# Patient Record
Sex: FEMALE | Race: WHITE | Employment: FULL TIME | URBAN - NONMETROPOLITAN AREA
[De-identification: names, ages, dates, MRNs, and addresses within clinical notes are randomized per-mention and may not be internally consistent; named-entity substitution may affect disease eponyms.]

---

## 2019-06-07 ENCOUNTER — APPOINTMENT (OUTPATIENT)
Dept: CT IMAGING | Age: 34
End: 2019-06-07
Payer: MEDICAID

## 2019-06-07 ENCOUNTER — HOSPITAL ENCOUNTER (EMERGENCY)
Age: 34
Discharge: HOME | End: 2019-06-07
Attending: EMERGENCY MEDICINE
Payer: MEDICAID

## 2019-06-07 VITALS
WEIGHT: 220 LBS | BODY MASS INDEX: 32.58 KG/M2 | DIASTOLIC BLOOD PRESSURE: 80 MMHG | TEMPERATURE: 97.9 F | HEART RATE: 97 BPM | HEIGHT: 69 IN | SYSTOLIC BLOOD PRESSURE: 115 MMHG | RESPIRATION RATE: 16 BRPM | OXYGEN SATURATION: 96 %

## 2019-06-07 DIAGNOSIS — R10.10 PAIN OF UPPER ABDOMEN: ICD-10-CM

## 2019-06-07 DIAGNOSIS — R11.2 NAUSEA AND VOMITING, INTRACTABILITY OF VOMITING NOT SPECIFIED, UNSPECIFIED VOMITING TYPE: Primary | ICD-10-CM

## 2019-06-07 DIAGNOSIS — K86.1 IDIOPATHIC CHRONIC PANCREATITIS (HCC): ICD-10-CM

## 2019-06-07 LAB
ALBUMIN SERPL-MCNC: 4 G/DL (ref 3.5–5.1)
ALP BLD-CCNC: 64 U/L (ref 38–126)
ALT SERPL-CCNC: 9 U/L (ref 11–66)
AMPHETAMINE+METHAMPHETAMINE URINE SCREEN: NEGATIVE
ANION GAP SERPL CALCULATED.3IONS-SCNC: 12 MEQ/L (ref 8–16)
AST SERPL-CCNC: 21 U/L (ref 5–40)
BACTERIA: ABNORMAL /HPF
BARBITURATE QUANTITATIVE URINE: NEGATIVE
BASOPHILS # BLD: 0.4 %
BASOPHILS ABSOLUTE: 0 THOU/MM3 (ref 0–0.1)
BENZODIAZEPINE QUANTITATIVE URINE: NEGATIVE
BILIRUB SERPL-MCNC: 0.3 MG/DL (ref 0.3–1.2)
BILIRUBIN URINE: ABNORMAL
BLOOD, URINE: NEGATIVE
BUN BLDV-MCNC: 12 MG/DL (ref 7–22)
CALCIUM SERPL-MCNC: 10.2 MG/DL (ref 8.5–10.5)
CANNABINOID QUANTITATIVE URINE: POSITIVE
CASTS 2: ABNORMAL /LPF
CASTS UA: ABNORMAL /LPF
CHARACTER, URINE: ABNORMAL
CHLORIDE BLD-SCNC: 108 MEQ/L (ref 98–111)
CO2: 19 MEQ/L (ref 23–33)
COCAINE METABOLITE QUANTITATIVE URINE: NEGATIVE
COLOR: ABNORMAL
CREAT SERPL-MCNC: 0.7 MG/DL (ref 0.4–1.2)
CRYSTALS, UA: ABNORMAL
EOSINOPHIL # BLD: 0.3 %
EOSINOPHILS ABSOLUTE: 0 THOU/MM3 (ref 0–0.4)
EPITHELIAL CELLS, UA: ABNORMAL /HPF
ERYTHROCYTE [DISTWIDTH] IN BLOOD BY AUTOMATED COUNT: 13.2 % (ref 11.5–14.5)
ERYTHROCYTE [DISTWIDTH] IN BLOOD BY AUTOMATED COUNT: 37.9 FL (ref 35–45)
GFR SERPL CREATININE-BSD FRML MDRD: > 90 ML/MIN/1.73M2
GLUCOSE BLD-MCNC: 103 MG/DL (ref 70–108)
GLUCOSE URINE: NEGATIVE MG/DL
HCT VFR BLD CALC: 35.7 % (ref 37–47)
HEMOGLOBIN: 12.8 GM/DL (ref 12–16)
ICTOTEST: NEGATIVE
IMMATURE GRANS (ABS): 0.08 THOU/MM3 (ref 0–0.07)
IMMATURE GRANULOCYTES: 0.8 %
KETONES, URINE: ABNORMAL
LEUKOCYTE ESTERASE, URINE: ABNORMAL
LIPASE: 167.5 U/L (ref 5.6–51.3)
LYMPHOCYTES # BLD: 26.6 %
LYMPHOCYTES ABSOLUTE: 2.5 THOU/MM3 (ref 1–4.8)
MCH RBC QN AUTO: 28.6 PG (ref 26–33)
MCHC RBC AUTO-ENTMCNC: 35.9 GM/DL (ref 32.2–35.5)
MCV RBC AUTO: 79.9 FL (ref 81–99)
MISCELLANEOUS 2: ABNORMAL
MONOCYTES # BLD: 5.3 %
MONOCYTES ABSOLUTE: 0.5 THOU/MM3 (ref 0.4–1.3)
NITRITE, URINE: NEGATIVE
NUCLEATED RED BLOOD CELLS: 0 /100 WBC
OPIATES, URINE: NEGATIVE
OSMOLALITY CALCULATION: 277.5 MOSMOL/KG (ref 275–300)
OXYCODONE: POSITIVE
PH UA: 6 (ref 5–9)
PHENCYCLIDINE QUANTITATIVE URINE: NEGATIVE
PLATELET # BLD: 311 THOU/MM3 (ref 130–400)
PMV BLD AUTO: 9.3 FL (ref 9.4–12.4)
POTASSIUM SERPL-SCNC: 4.9 MEQ/L (ref 3.5–5.2)
PREGNANCY, URINE: NEGATIVE
PROTEIN UA: 30
RBC # BLD: 4.47 MILL/MM3 (ref 4.2–5.4)
RBC URINE: ABNORMAL /HPF
RENAL EPITHELIAL, UA: ABNORMAL
SEG NEUTROPHILS: 66.6 %
SEGMENTED NEUTROPHILS ABSOLUTE COUNT: 6.3 THOU/MM3 (ref 1.8–7.7)
SODIUM BLD-SCNC: 139 MEQ/L (ref 135–145)
SPECIFIC GRAVITY, URINE: 1.03 (ref 1–1.03)
TOTAL PROTEIN: 7.5 G/DL (ref 6.1–8)
UROBILINOGEN, URINE: 1 EU/DL (ref 0–1)
WBC # BLD: 9.4 THOU/MM3 (ref 4.8–10.8)
WBC UA: ABNORMAL /HPF
YEAST: ABNORMAL

## 2019-06-07 PROCEDURE — 74176 CT ABD & PELVIS W/O CONTRAST: CPT

## 2019-06-07 PROCEDURE — 81025 URINE PREGNANCY TEST: CPT

## 2019-06-07 PROCEDURE — 87086 URINE CULTURE/COLONY COUNT: CPT

## 2019-06-07 PROCEDURE — 80053 COMPREHEN METABOLIC PANEL: CPT

## 2019-06-07 PROCEDURE — 80307 DRUG TEST PRSMV CHEM ANLYZR: CPT

## 2019-06-07 PROCEDURE — 99284 EMERGENCY DEPT VISIT MOD MDM: CPT

## 2019-06-07 PROCEDURE — 85025 COMPLETE CBC W/AUTO DIFF WBC: CPT

## 2019-06-07 PROCEDURE — 6360000002 HC RX W HCPCS: Performed by: EMERGENCY MEDICINE

## 2019-06-07 PROCEDURE — 96372 THER/PROPH/DIAG INJ SC/IM: CPT

## 2019-06-07 PROCEDURE — 36415 COLL VENOUS BLD VENIPUNCTURE: CPT

## 2019-06-07 PROCEDURE — 81001 URINALYSIS AUTO W/SCOPE: CPT

## 2019-06-07 PROCEDURE — 83690 ASSAY OF LIPASE: CPT

## 2019-06-07 RX ORDER — DICYCLOMINE HYDROCHLORIDE 10 MG/1
10 CAPSULE ORAL ONCE
Status: DISCONTINUED | OUTPATIENT
Start: 2019-06-07 | End: 2019-06-07 | Stop reason: HOSPADM

## 2019-06-07 RX ORDER — KETOROLAC TROMETHAMINE 30 MG/ML
60 INJECTION, SOLUTION INTRAMUSCULAR; INTRAVENOUS ONCE
Status: COMPLETED | OUTPATIENT
Start: 2019-06-07 | End: 2019-06-07

## 2019-06-07 RX ORDER — SUCRALFATE ORAL 1 G/10ML
1 SUSPENSION ORAL 4 TIMES DAILY
Qty: 1200 ML | Refills: 3 | Status: SHIPPED | OUTPATIENT
Start: 2019-06-07

## 2019-06-07 RX ORDER — PROMETHAZINE HYDROCHLORIDE 25 MG/1
25 TABLET ORAL EVERY 6 HOURS PRN
Qty: 24 TABLET | Refills: 0 | Status: SHIPPED | OUTPATIENT
Start: 2019-06-07 | End: 2019-06-14

## 2019-06-07 RX ORDER — PROMETHAZINE HYDROCHLORIDE 25 MG/ML
25 INJECTION, SOLUTION INTRAMUSCULAR; INTRAVENOUS ONCE
Status: COMPLETED | OUTPATIENT
Start: 2019-06-07 | End: 2019-06-07

## 2019-06-07 RX ORDER — PANTOPRAZOLE SODIUM 40 MG/1
40 GRANULE, DELAYED RELEASE ORAL
COMMUNITY
End: 2019-06-07

## 2019-06-07 RX ORDER — SUCRALFATE 1 G/1
1 TABLET ORAL 4 TIMES DAILY
COMMUNITY
End: 2019-06-07

## 2019-06-07 RX ORDER — PANTOPRAZOLE SODIUM 40 MG/1
40 TABLET, DELAYED RELEASE ORAL DAILY
Qty: 30 TABLET | Refills: 0 | Status: SHIPPED | OUTPATIENT
Start: 2019-06-07 | End: 2019-12-12

## 2019-06-07 RX ORDER — PANTOPRAZOLE SODIUM 40 MG/1
40 TABLET, DELAYED RELEASE ORAL ONCE
Status: DISCONTINUED | OUTPATIENT
Start: 2019-06-07 | End: 2019-06-07 | Stop reason: HOSPADM

## 2019-06-07 RX ADMIN — PROMETHAZINE HYDROCHLORIDE 25 MG: 25 INJECTION INTRAMUSCULAR; INTRAVENOUS at 19:21

## 2019-06-07 RX ADMIN — PROMETHAZINE HYDROCHLORIDE 25 MG: 25 INJECTION INTRAMUSCULAR; INTRAVENOUS at 16:56

## 2019-06-07 RX ADMIN — KETOROLAC TROMETHAMINE 60 MG: 30 INJECTION, SOLUTION INTRAMUSCULAR at 19:21

## 2019-06-07 SDOH — HEALTH STABILITY: MENTAL HEALTH: HOW OFTEN DO YOU HAVE A DRINK CONTAINING ALCOHOL?: NEVER

## 2019-06-07 ASSESSMENT — ENCOUNTER SYMPTOMS
DIARRHEA: 1
EYE DISCHARGE: 0
WHEEZING: 0
VOMITING: 0
EYE PAIN: 0
BACK PAIN: 0
SORE THROAT: 0
COUGH: 0
ABDOMINAL PAIN: 1
SHORTNESS OF BREATH: 0
RHINORRHEA: 0
NAUSEA: 1

## 2019-06-07 ASSESSMENT — PAIN DESCRIPTION - ONSET: ONSET: ON-GOING

## 2019-06-07 ASSESSMENT — PAIN DESCRIPTION - DESCRIPTORS
DESCRIPTORS: ACHING
DESCRIPTORS: ACHING

## 2019-06-07 ASSESSMENT — PAIN DESCRIPTION - PAIN TYPE
TYPE: ACUTE PAIN

## 2019-06-07 ASSESSMENT — PAIN DESCRIPTION - PROGRESSION: CLINICAL_PROGRESSION: GRADUALLY WORSENING

## 2019-06-07 ASSESSMENT — PAIN DESCRIPTION - FREQUENCY
FREQUENCY: CONTINUOUS
FREQUENCY: CONTINUOUS

## 2019-06-07 ASSESSMENT — PAIN SCALES - GENERAL
PAINLEVEL_OUTOF10: 10

## 2019-06-07 ASSESSMENT — PAIN DESCRIPTION - LOCATION
LOCATION: ABDOMEN

## 2019-06-07 NOTE — ED PROVIDER NOTES
5.1 g/dL    Total Bilirubin 0.3 0.3 - 1.2 mg/dL    ALT 9 (L) 11 - 66 U/L   Lipase   Result Value Ref Range    Lipase 167.5 (H) 5.6 - 51.3 U/L   Urine with Reflexed Micro   Result Value Ref Range    Glucose, Ur NEGATIVE NEGATIVE mg/dl    Bilirubin Urine SMALL (A) NEGATIVE    Ketones, Urine TRACE (A) NEGATIVE    Specific Gravity, Urine 1.027 1.002 - 1.03    Blood, Urine NEGATIVE NEGATIVE    pH, UA 6.0 5.0 - 9.0    Protein, UA 30 (A) NEGATIVE    Urobilinogen, Urine 1.0 0.0 - 1.0 eu/dl    Nitrite, Urine NEGATIVE NEGATIVE    Leukocyte Esterase, Urine MODERATE (A) NEGATIVE    Color, UA DK YELLOW (A) STRAW-YELL    Character, Urine CLOUDY (A) CLEAR-SL C    RBC, UA 3-5 0-2/hpf /hpf    WBC, UA 5-10 0-4/hpf /hpf    Epi Cells 5-10 3-5/hpf /hpf    Bacteria, UA NONE FEW/NONE S /hpf    Casts UA NONE SEEN NONE SEEN /lpf    Crystals OXALATE NONE SEEN    Renal Epithelial, Urine NONE SEEN NONE SEEN    Yeast, UA NONE SEEN NONE SEEN    CASTS 2 NONE SEEN NONE SEEN /lpf    MISCELLANEOUS 2 NONE SEEN    Bile Acids, Total   Result Value Ref Range    Ictotest NEGATIVE NEGATIVE   Urine Drug Screen   Result Value Ref Range    AMPHETAMINE+METHAMPHETAMINE URINE SCREEN Negative NEGATIVE    Barbiturate Quant, Ur Negative NEGATIVE    Benzodiazepine Quant, Ur Negative NEGATIVE    Cannabinoid Quant, Ur POSITIVE NEGATIVE    Cocaine Metab Quant, Ur Negative NEGATIVE    Opiates, Urine Negative NEGATIVE    Oxycodone POSITIVE NEGATIVE    PCP Quant, Ur Negative NEGATIVE   Anion Gap   Result Value Ref Range    Anion Gap 12.0 8.0 - 16.0 meq/L   Glomerular Filtration Rate, Estimated   Result Value Ref Range    Est, Glom Filt Rate >90 ml/min/1.73m2   Osmolality   Result Value Ref Range    Osmolality Calc 277.5 275.0 - 300 mOsmol/kg       RADIOLOGY:  CT ABDOMEN PELVIS WO CONTRAST Additional Contrast? None   Final Result      1. Probable right renal cortical cysts incompletely evaluated on noncontrast examination. Correlation with urinalysis is advised.    2. Small, nonobstructing left intrarenal calculi. No hydronephrosis. 3. Moderate scattered stool in the colon. No bowel wall thickening or obstruction. **This report has been created using voice recognition software. It may contain minor errors which are inherent in voice recognition technology. **      Final report electronically signed by Dr. Delorise Bloch on 6/7/2019 5:39 PM        Controlled Substance Monitoring:    Acute and Chronic Pain Monitoring:   RX Monitoring 6/7/2019   Periodic Controlled Substance Monitoring Possible medication side effects, risk of tolerance/dependence & alternative treatments discussed. ;Potential drug abuse or diversion identified, see note documentation. ;Random urine drug screen sent today. Orders Placed This Encounter   Medications    dicyclomine (BENTYL) capsule 10 mg    promethazine (PHENERGAN) injection 25 mg    pantoprazole (PROTONIX) tablet 40 mg    promethazine (PHENERGAN) 25 MG tablet     Sig: Take 1 tablet by mouth every 6 hours as needed for Nausea     Dispense:  24 tablet     Refill:  0    sucralfate (CARAFATE) 1 GM/10ML suspension     Sig: Take 10 mLs by mouth 4 times daily     Dispense:  1200 mL     Refill:  3    pantoprazole (PROTONIX) 40 MG tablet     Sig: Take 1 tablet by mouth daily for 30 doses     Dispense:  30 tablet     Refill:  0    promethazine (PHENERGAN) injection 25 mg    ketorolac (TORADOL) injection 60 mg       MEDICAL DECISION MAKING:  This patient is a 35 y.o. Female being evaluated for abdominal pain with a history of chronic pancreatitis and more recently reportedly diagnosed with peptic ulcer disease. She has just moved to our area and is just beginning to seek treatment for her chronic complaints. She had 2 visits late May at [address] and this is her third visit.   Attempts at obtaining old records from the previous hospitals both [facility] and hospitals in [place] were unsuccessful apparently due to a name change from a recent marriage. Workup here consistent with chronic pancreatitis without evidence of infection or necrosis or acute abdomen. Prescription monitoring reporting under her current name here indicates only one prescription and none from [place]. One prescription here is for Vicodin but oxycodone shows up in her urine with cannabis. She has been given several injections of Phenergan and I will add Toradol for pain. We discussed her current medications and I will refill her prescription for Protonix and Phenergan and switch her to Carafate suspension as she indicates she cannot tolerate the large Carafate tablets. I have arranged a follow-up appointment with the family medicine practice clinic at 8:20 AM on [address]. FINAL IMPRESSION:  1. Nausea and vomiting, intractability of vomiting not specified, unspecified vomiting type    2. Pain of upper abdomen    3. Idiopathic chronic pancreatitis (HCC)      DISPOSITION:  The patient presents with abdominal pain. She is diagnosed with chronic pancreatitis without evidence of infection gangrene or necrosis. I see nothing that would suggest an acute abdomen at this time. Based on history, physical exam, risk factors, and tests; my suspicion for bowel obstruction, abscess, perforated viscous, diverticulitis, cholecystis, ovarian torsion, appendicitis is very low and I feel the patient can be managed as an outpatient with follow up. Instructions have been given for the patient to return to the ED for worsening of the pain, high fevers, intractable vomiting, or bleeding. FOLLOW-UP:  [address] [address],[address] [facility]. [address] [address] [facility] [place] [address]  Go on 6/10/2019  For [address] appointment at 8:20 AM    [facility] EMERGENCY DEPT  [facility]  [phone]    Return If Symptoms Worsen         Lopez Ashley MD  06/07/19 2011

## 2019-06-07 NOTE — ED NOTES
Patient refusing oral medications at this time due to nausea. Patient medicated with Phenergan per order.      Chino Avilez RN  06/07/19 9682

## 2019-06-07 NOTE — ED NOTES
Pt removing monitor and yelling at staff. Pt upset about not receiving pain medication and requesting to speak with the Dr. Enma Tireney to reassess VS at this time. Dr. Brice Wray notified.      Marietta Srinivasan RN  06/07/19 1857       Marietta Srinivasan RN  06/07/19 1647

## 2019-06-07 NOTE — ED TRIAGE NOTES
Pt comes tot he ED with abdominal pain and nausea intermittent for the past week. Pt states she has a long hx of pancreatitis and she can't keep anything down.

## 2019-06-07 NOTE — ED PROVIDER NOTES
congestion, ear pain, rhinorrhea and sore throat. Eyes: Negative for pain, discharge and visual disturbance. Respiratory: Negative for cough, shortness of breath and wheezing. Cardiovascular: Negative for chest pain, palpitations and leg swelling. Gastrointestinal: Positive for abdominal pain, diarrhea and nausea. Negative for vomiting. Genitourinary: Negative for difficulty urinating, dysuria, hematuria and vaginal discharge. Musculoskeletal: Negative for arthralgias, back pain, joint swelling and neck pain. Skin: Negative for pallor and rash. Neurological: Negative for dizziness, syncope, weakness, light-headedness, numbness and headaches. Hematological: Negative for adenopathy. Psychiatric/Behavioral: Negative for confusion and suicidal ideas. The patient is not nervous/anxious. PAST MEDICAL HISTORY    has a past medical history of Endometriosis and Pancreatitis. SURGICAL HISTORY      has a past surgical history that includes Upper gastrointestinal endoscopy; Cholecystectomy; Hysterectomy; ERCP; and Tonsillectomy. CURRENT MEDICATIONS       Previous Medications    PANTOPRAZOLE SODIUM (PROTONIX) 40 MG PACK PACKET    Take 40 mg by mouth every morning (before breakfast)    SUCRALFATE (CARAFATE) 1 GM TABLET    Take 1 g by mouth 4 times daily       ALLERGIES     is allergic to morphine and zofran [ondansetron hcl]. FAMILY HISTORY     has no family status information on file. family history is not on file. SOCIAL HISTORY      reports that she has never smoked. She has never used smokeless tobacco. She reports that she has current or past drug history. Drug: Marijuana. She reports that she does not drink alcohol. PHYSICAL EXAM     INITIAL VITALS:  height is 5' 9\" (1.753 m) and weight is 220 lb (99.8 kg). Her oral temperature is 97.9 °F (36.6 °C). Her blood pressure is 115/80 and her pulse is 97. Her respiration is 16 and oxygen saturation is 96%.       CONSTITUTIONAL: [Awake, alert, non toxic, well developed, well nourished, tearful]    HEAD: [Normocephalic, atraumatic]  EYES: [Pupils equal, round & reactive to light, extraocular movements intact, no nystagmus, clear conjunctiva, non-icteric sclera]  ENT: [External ear canal clear without evidence of cerumen impaction or foreign body, TM's clear without erythema or bulging. Nares patent without drainage, septum appears midline. Moist mucus membranes, oropharynx clear without exudate, erythema, or mass. Uvula midline]  NECK: [Nontender and supple. No meningismus, no appreciated lymphadenopathy. Intact full range of motion. C-spine midline without vertebral tenderness. Trachea midline.]  CHEST: [Inspection normal, no lesions, equal rise. No crepitus or tenderness upon palpation.]  CARDIOVASCULAR: [Regular rate, rhythm, normal S1 and S2. No appreciated murmurs, rubs, or gallops. No pulse deficits appreciated. Intact distal perfusion. JVD not appreciated.]  PULMONARY: [Respiratory distress absent. Respiratory effort normal. Breath sounds clear to auscultation without rhonchi, rales, or wheezing. No accessory muscle use. No stridor]  ABDOMEN: [Inspection normal, with well healed surgical scars consistent with lap elvira. Soft, non-tender, non-distended, with normoactive bowel sounds. No palpable masses, rebound, or guarding]  BACK: [Intact ROM. No midline vertebral tenderness, step off, or crepitus. No CVA tenderness.]  MUSCULOSKELETAL: [Extremities nontender to palpation. No gross deformity or evidence of external trauma. Intact range of motion. Sensation intact. No clubbing, cyanosis, or edema.]  SKIN: [Warm, dry. No jaundice, rash, urticaria, or petechiae]  NEUROLOGIC: [Alert and oriented x 3, GCS 15, normal mentation for age. Moves all four extremities. No gross sensory deficit.  Cerebellar function grossly normal.]  PSYCHIATRIC: [Normal mood and affect, thought process is clear and linear]       DIFFERENTIAL DIAGNOSIS: Differential Dx Lists - I consider the following to be a partial list of the possible etiologies for the patient's symptoms and based on my clinical findings as well and are part of my medical decision making:    Abdominal pain: gastritis, GERD, PUD, ??narcotic seeking behavior less likely pancreatitis, appendicitis, bowel perforation, obstruction, and others      DIAGNOSTIC RESULTS     EKG: All EKG's are interpreted by the Emergency Department Physician who either signs or Co-signsthis chart in the absence of a cardiologist.  None    RADIOLOGY: non-plain film images(s) such as CT,Ultrasound and MRI are read by the radiologist.    No orders to display     [] Visualized and interpreted by me   [] Radiologist's Wet Read Report Reviewed   [] Discussed withRadiologist.    LABS:   Labs Reviewed   URINE WITH REFLEXED MICRO - Abnormal; Notable for the following components:       Result Value    Bilirubin Urine SMALL (*)     Ketones, Urine TRACE (*)     Protein, UA 30 (*)     Leukocyte Esterase, Urine MODERATE (*)     Color, UA DK YELLOW (*)     Character, Urine CLOUDY (*)     All other components within normal limits   URINE CULTURE, REFLEXED   PREGNANCY, URINE   BILE ACIDS, TOTAL   CBC WITH AUTO DIFFERENTIAL   COMPREHENSIVE METABOLIC PANEL   LIPASE       EMERGENCY DEPARTMENT COURSE:   Vitals:    Vitals:    06/07/19 1518 06/07/19 1644   BP: (!) 148/93 115/80   Pulse: 111 97   Resp: 20 16   Temp: 97.9 °F (36.6 °C)    TempSrc: Oral    SpO2: 98% 96%   Weight: 220 lb (99.8 kg)    Height: 5' 9\" (1.753 m)      When the patient is asked specific questions about hospital or physician names, she is unable to give clear answers. She also seemed to contradict herself about the time frame of her move from [place] to [place] at times. Records reviewed in Epic. Patient had visit at [facility] on 5/27. This hospital is in another [facility] and not located near [facility], which is where patient had reported receiving care recently. Also, records from the [facility] ED visit show that patient received a total of 3mg of IM dilaudid for pain and then left AMA. I requested staff to contact [facility] for any recent visit history as those records are not available in [facility]. Also, the hospital in [facility] is listed online as [facility].  I also requested that staff contact that facility in order to request old records especially GI notes. I was unable to find any GI specialist listed with a name resembling patient's attempted spelling of her GI specialist online in [place] or elsewhere. Patient's care was turned over to Dr. Irish Jeffries. Ct scan and labs are pending at this time. Non-narcotic medications were ordered for patient. CRITICAL CARE:   None    CONSULTS:  None    PROCEDURES:  None    FINAL IMPRESSION    No diagnosis found. 1. Abdominal pain, nausea, diarrhea  DISPOSITION/PLAN   Pending at time of sign out    PATIENT REFERRED TO:  No follow-up provider specified. DISCHARGE MEDICATIONS:  New Prescriptions    No medications on file       (Please note that portions ofthis note were completed with a voice recognition program.  Efforts were made to edit the dictations but occasionally words are mis-transcribed.)    Scribe:  Dedrick Castillo 6/7/19 4:47 PM Scribing for and in the presence of Annabelle Huddleston MD.    Signed by: Stefany Cain, 06/07/19 4:47 PM    Provider:  I personally performed the services described in the documentation, reviewed and edited the documentation which was dictated to the scribe in my presence, and it accurately records my words and actions.     Annabelle Huddleston MD 6/7/19 4:47 PM                  Annabelle Huddleston MD  06/08/19 4373

## 2019-06-08 LAB
ORGANISM: ABNORMAL
URINE CULTURE REFLEX: ABNORMAL

## 2019-06-10 ENCOUNTER — NURSE ONLY (OUTPATIENT)
Dept: LAB | Age: 34
End: 2019-06-10

## 2019-06-10 ENCOUNTER — HOSPITAL ENCOUNTER (EMERGENCY)
Age: 34
Discharge: HOME | End: 2019-06-10
Payer: MEDICAID

## 2019-06-10 ENCOUNTER — APPOINTMENT (OUTPATIENT)
Dept: CT IMAGING | Age: 34
End: 2019-06-10
Payer: MEDICAID

## 2019-06-10 ENCOUNTER — TELEPHONE (OUTPATIENT)
Dept: FAMILY MEDICINE CLINIC | Age: 34
End: 2019-06-10

## 2019-06-10 ENCOUNTER — OFFICE VISIT (OUTPATIENT)
Dept: FAMILY MEDICINE CLINIC | Age: 34
End: 2019-06-10
Payer: COMMERCIAL

## 2019-06-10 VITALS
TEMPERATURE: 98.2 F | OXYGEN SATURATION: 97 % | RESPIRATION RATE: 12 BRPM | HEIGHT: 69 IN | HEART RATE: 121 BPM | WEIGHT: 211.4 LBS | BODY MASS INDEX: 31.31 KG/M2 | SYSTOLIC BLOOD PRESSURE: 127 MMHG | DIASTOLIC BLOOD PRESSURE: 93 MMHG

## 2019-06-10 VITALS
RESPIRATION RATE: 20 BRPM | SYSTOLIC BLOOD PRESSURE: 117 MMHG | DIASTOLIC BLOOD PRESSURE: 86 MMHG | HEART RATE: 109 BPM | HEIGHT: 69 IN | OXYGEN SATURATION: 98 % | BODY MASS INDEX: 31.25 KG/M2 | TEMPERATURE: 98.2 F | WEIGHT: 211 LBS

## 2019-06-10 DIAGNOSIS — Z11.4 ENCOUNTER FOR SCREENING FOR HIV: ICD-10-CM

## 2019-06-10 DIAGNOSIS — R10.9 CHRONIC ABDOMINAL PAIN: Primary | ICD-10-CM

## 2019-06-10 DIAGNOSIS — Z78.9 UNKNOWN VARICELLA VACCINATION STATUS: ICD-10-CM

## 2019-06-10 DIAGNOSIS — K52.9 ACUTE GASTROENTERITIS: ICD-10-CM

## 2019-06-10 DIAGNOSIS — Z13.220 ENCOUNTER FOR SCREENING FOR LIPID DISORDER: ICD-10-CM

## 2019-06-10 DIAGNOSIS — K21.9 GASTROESOPHAGEAL REFLUX DISEASE WITHOUT ESOPHAGITIS: ICD-10-CM

## 2019-06-10 DIAGNOSIS — K86.1 CHRONIC PANCREATITIS, UNSPECIFIED PANCREATITIS TYPE (HCC): ICD-10-CM

## 2019-06-10 DIAGNOSIS — A49.8 CLOSTRIDIUM DIFFICILE INFECTION: Primary | ICD-10-CM

## 2019-06-10 DIAGNOSIS — K86.1 CHRONIC PANCREATITIS, UNSPECIFIED PANCREATITIS TYPE (HCC): Primary | ICD-10-CM

## 2019-06-10 DIAGNOSIS — R53.83 FATIGUE, UNSPECIFIED TYPE: ICD-10-CM

## 2019-06-10 DIAGNOSIS — Z23 NEED FOR PROPHYLACTIC VACCINATION AGAINST DIPHTHERIA-TETANUS-PERTUSSIS (DTP): ICD-10-CM

## 2019-06-10 DIAGNOSIS — D64.9 ANEMIA, UNSPECIFIED TYPE: ICD-10-CM

## 2019-06-10 DIAGNOSIS — A04.72 C. DIFFICILE COLITIS: ICD-10-CM

## 2019-06-10 DIAGNOSIS — G47.00 INSOMNIA, UNSPECIFIED TYPE: ICD-10-CM

## 2019-06-10 DIAGNOSIS — G89.29 CHRONIC ABDOMINAL PAIN: Primary | ICD-10-CM

## 2019-06-10 DIAGNOSIS — N80.9 ENDOMETRIOSIS: ICD-10-CM

## 2019-06-10 DIAGNOSIS — F41.8 MIXED ANXIETY AND DEPRESSIVE DISORDER: ICD-10-CM

## 2019-06-10 LAB
ADENOVIRUS F 40 41 PCR: NOT DETECTED
ALBUMIN SERPL-MCNC: 3.9 G/DL (ref 3.5–5.1)
ALP BLD-CCNC: 63 U/L (ref 38–126)
ALT SERPL-CCNC: 7 U/L (ref 11–66)
ANION GAP SERPL CALCULATED.3IONS-SCNC: 15 MEQ/L (ref 8–16)
AST SERPL-CCNC: 10 U/L (ref 5–40)
ASTROVIRUS PCR: NOT DETECTED
BASOPHILS # BLD: 0.2 %
BASOPHILS ABSOLUTE: 0 THOU/MM3 (ref 0–0.1)
BILIRUB SERPL-MCNC: 0.4 MG/DL (ref 0.3–1.2)
BILIRUBIN DIRECT: < 0.2 MG/DL (ref 0–0.3)
BUN BLDV-MCNC: 10 MG/DL (ref 7–22)
CALCIUM SERPL-MCNC: 9.4 MG/DL (ref 8.5–10.5)
CAMPYLOBACTER PCR: NOT DETECTED
CHLORIDE BLD-SCNC: 105 MEQ/L (ref 98–111)
CLOSTRIDIUM DIFFICILE, PCR: DETECTED
CO2: 21 MEQ/L (ref 23–33)
CREAT SERPL-MCNC: 0.6 MG/DL (ref 0.4–1.2)
CRYPTOSPORIDIUM PCR: NOT DETECTED
CYCLOSPORA CAYETANENSIS PCR: NOT DETECTED
E COLI 0157 PCR: ABNORMAL
E COLI ENTEROAGGREGATIVE PCR: NOT DETECTED
E COLI ENTEROPATHOGENIC PCR: NOT DETECTED
E COLI ENTEROTOXIGENIC PCR: NOT DETECTED
E COLI SHIGA LIKE TOXIN PCR: NOT DETECTED
E COLI SHIGELLA/ENTEROINVASIVE PCR: NOT DETECTED
E HISTOLYTICA GI FILM ARRAY: NOT DETECTED
EOSINOPHIL # BLD: 0.2 %
EOSINOPHILS ABSOLUTE: 0 THOU/MM3 (ref 0–0.4)
ERYTHROCYTE [DISTWIDTH] IN BLOOD BY AUTOMATED COUNT: 13.2 % (ref 11.5–14.5)
ERYTHROCYTE [DISTWIDTH] IN BLOOD BY AUTOMATED COUNT: 40.7 FL (ref 35–45)
ETHYL ALCOHOL, SERUM: < 0.01 %
GFR SERPL CREATININE-BSD FRML MDRD: > 90 ML/MIN/1.73M2
GIARDIA LAMBLIA PCR: NOT DETECTED
GLUCOSE BLD-MCNC: 97 MG/DL (ref 70–108)
HCT VFR BLD CALC: 36.1 % (ref 37–47)
HEMOGLOBIN: 12.1 GM/DL (ref 12–16)
IMMATURE GRANS (ABS): 0.02 THOU/MM3 (ref 0–0.07)
IMMATURE GRANULOCYTES: 0.2 %
LACTIC ACID, SEPSIS: 0.9 MMOL/L (ref 0.5–1.9)
LIPASE: 113.4 U/L (ref 5.6–51.3)
LYMPHOCYTES # BLD: 34.5 %
LYMPHOCYTES ABSOLUTE: 3.3 THOU/MM3 (ref 1–4.8)
MAGNESIUM: 2.2 MG/DL (ref 1.6–2.4)
MCH RBC QN AUTO: 28.5 PG (ref 26–33)
MCHC RBC AUTO-ENTMCNC: 33.5 GM/DL (ref 32.2–35.5)
MCV RBC AUTO: 84.9 FL (ref 81–99)
MONOCYTES # BLD: 5.2 %
MONOCYTES ABSOLUTE: 0.5 THOU/MM3 (ref 0.4–1.3)
NOROVIRUS GI GII PCR: NOT DETECTED
NUCLEATED RED BLOOD CELLS: 0 /100 WBC
OSMOLALITY CALCULATION: 280.2 MOSMOL/KG (ref 275–300)
PLATELET # BLD: 354 THOU/MM3 (ref 130–400)
PLESIOMONAS SHIGELLOIDES PCR: NOT DETECTED
PMV BLD AUTO: 9.4 FL (ref 9.4–12.4)
POTASSIUM SERPL-SCNC: 3.3 MEQ/L (ref 3.5–5.2)
PREGNANCY, SERUM: NEGATIVE
PROCALCITONIN: 0.03 NG/ML (ref 0.01–0.09)
RBC # BLD: 4.25 MILL/MM3 (ref 4.2–5.4)
ROTAVIRUS A PCR: NOT DETECTED
SALMONELLA PCR: NOT DETECTED
SAPOVIRUS PCR: NOT DETECTED
SEG NEUTROPHILS: 59.7 %
SEGMENTED NEUTROPHILS ABSOLUTE COUNT: 5.7 THOU/MM3 (ref 1.8–7.7)
SODIUM BLD-SCNC: 141 MEQ/L (ref 135–145)
TOTAL PROTEIN: 7.4 G/DL (ref 6.1–8)
VIBRIO CHOLERAE PCR: NOT DETECTED
VIBRIO PCR: NOT DETECTED
WBC # BLD: 9.6 THOU/MM3 (ref 4.8–10.8)
YERSINIA ENTEROCOLITICA PCR: NOT DETECTED

## 2019-06-10 PROCEDURE — 99204 OFFICE O/P NEW MOD 45 MIN: CPT | Performed by: NURSE PRACTITIONER

## 2019-06-10 PROCEDURE — 6360000002 HC RX W HCPCS: Performed by: NURSE PRACTITIONER

## 2019-06-10 PROCEDURE — 36415 COLL VENOUS BLD VENIPUNCTURE: CPT

## 2019-06-10 PROCEDURE — 82248 BILIRUBIN DIRECT: CPT

## 2019-06-10 PROCEDURE — 99283 EMERGENCY DEPT VISIT LOW MDM: CPT

## 2019-06-10 PROCEDURE — 80053 COMPREHEN METABOLIC PANEL: CPT

## 2019-06-10 PROCEDURE — C1751 CATH, INF, PER/CENT/MIDLINE: HCPCS

## 2019-06-10 PROCEDURE — 85025 COMPLETE CBC W/AUTO DIFF WBC: CPT

## 2019-06-10 PROCEDURE — 96372 THER/PROPH/DIAG INJ SC/IM: CPT

## 2019-06-10 PROCEDURE — G0480 DRUG TEST DEF 1-7 CLASSES: HCPCS

## 2019-06-10 PROCEDURE — 83690 ASSAY OF LIPASE: CPT

## 2019-06-10 PROCEDURE — 2709999900 HC NON-CHARGEABLE SUPPLY

## 2019-06-10 PROCEDURE — 96374 THER/PROPH/DIAG INJ IV PUSH: CPT

## 2019-06-10 PROCEDURE — 83735 ASSAY OF MAGNESIUM: CPT

## 2019-06-10 PROCEDURE — 83605 ASSAY OF LACTIC ACID: CPT

## 2019-06-10 PROCEDURE — 6360000002 HC RX W HCPCS: Performed by: PHYSICIAN ASSISTANT

## 2019-06-10 PROCEDURE — 2580000003 HC RX 258: Performed by: PHYSICIAN ASSISTANT

## 2019-06-10 PROCEDURE — 84145 PROCALCITONIN (PCT): CPT

## 2019-06-10 PROCEDURE — 84703 CHORIONIC GONADOTROPIN ASSAY: CPT

## 2019-06-10 RX ORDER — 0.9 % SODIUM CHLORIDE 0.9 %
1000 INTRAVENOUS SOLUTION INTRAVENOUS ONCE
Status: COMPLETED | OUTPATIENT
Start: 2019-06-10 | End: 2019-06-10

## 2019-06-10 RX ORDER — METOCLOPRAMIDE HYDROCHLORIDE 5 MG/ML
10 INJECTION INTRAMUSCULAR; INTRAVENOUS ONCE
Status: COMPLETED | OUTPATIENT
Start: 2019-06-10 | End: 2019-06-10

## 2019-06-10 RX ORDER — PANTOPRAZOLE SODIUM 40 MG/1
40 TABLET, DELAYED RELEASE ORAL ONCE
Status: DISCONTINUED | OUTPATIENT
Start: 2019-06-10 | End: 2019-06-10

## 2019-06-10 RX ORDER — KETOROLAC TROMETHAMINE 30 MG/ML
30 INJECTION, SOLUTION INTRAMUSCULAR; INTRAVENOUS ONCE
Status: COMPLETED | OUTPATIENT
Start: 2019-06-10 | End: 2019-06-10

## 2019-06-10 RX ORDER — PROMETHAZINE HYDROCHLORIDE 25 MG/ML
12.5 INJECTION, SOLUTION INTRAMUSCULAR; INTRAVENOUS ONCE
Status: COMPLETED | OUTPATIENT
Start: 2019-06-10 | End: 2019-06-10

## 2019-06-10 RX ORDER — PROMETHAZINE HYDROCHLORIDE 25 MG/1
25 SUPPOSITORY RECTAL EVERY 6 HOURS PRN
Qty: 20 SUPPOSITORY | Refills: 0 | Status: SHIPPED | OUTPATIENT
Start: 2019-06-10 | End: 2019-06-15

## 2019-06-10 RX ORDER — METOCLOPRAMIDE 10 MG/1
10 TABLET ORAL ONCE
Status: DISCONTINUED | OUTPATIENT
Start: 2019-06-10 | End: 2019-06-10

## 2019-06-10 RX ORDER — HALOPERIDOL 5 MG/ML
2 INJECTION INTRAMUSCULAR ONCE
Status: DISCONTINUED | OUTPATIENT
Start: 2019-06-10 | End: 2019-06-11 | Stop reason: HOSPADM

## 2019-06-10 RX ORDER — FENTANYL CITRATE 50 UG/ML
25 INJECTION, SOLUTION INTRAMUSCULAR; INTRAVENOUS ONCE
Status: COMPLETED | OUTPATIENT
Start: 2019-06-10 | End: 2019-06-10

## 2019-06-10 RX ORDER — DICYCLOMINE HYDROCHLORIDE 10 MG/1
10 CAPSULE ORAL 4 TIMES DAILY
Qty: 120 CAPSULE | Refills: 0 | Status: SHIPPED | OUTPATIENT
Start: 2019-06-10 | End: 2019-12-12

## 2019-06-10 RX ORDER — METRONIDAZOLE 500 MG/1
500 TABLET ORAL 3 TIMES DAILY
Qty: 30 TABLET | Refills: 0 | Status: SHIPPED | OUTPATIENT
Start: 2019-06-10 | End: 2019-06-20

## 2019-06-10 RX ORDER — SIMETHICONE 80 MG
80 TABLET,CHEWABLE ORAL EVERY 6 HOURS PRN
Qty: 20 TABLET | Refills: 0 | Status: SHIPPED | OUTPATIENT
Start: 2019-06-10 | End: 2019-10-09

## 2019-06-10 RX ADMIN — PROMETHAZINE HYDROCHLORIDE 12.5 MG: 25 INJECTION INTRAMUSCULAR; INTRAVENOUS at 23:37

## 2019-06-10 RX ADMIN — METOCLOPRAMIDE 10 MG: 5 INJECTION, SOLUTION INTRAMUSCULAR; INTRAVENOUS at 20:45

## 2019-06-10 RX ADMIN — SODIUM CHLORIDE 1000 ML: 9 INJECTION, SOLUTION INTRAVENOUS at 22:05

## 2019-06-10 RX ADMIN — KETOROLAC TROMETHAMINE 30 MG: 30 INJECTION, SOLUTION INTRAMUSCULAR at 20:44

## 2019-06-10 RX ADMIN — FENTANYL CITRATE 25 MCG: 50 INJECTION INTRAMUSCULAR; INTRAVENOUS at 22:33

## 2019-06-10 ASSESSMENT — PAIN DESCRIPTION - LOCATION: LOCATION: ABDOMEN

## 2019-06-10 ASSESSMENT — ENCOUNTER SYMPTOMS
ANAL BLEEDING: 0
COUGH: 0
DIARRHEA: 1
SORE THROAT: 0
SHORTNESS OF BREATH: 0
COUGH: 0
SHORTNESS OF BREATH: 0
WHEEZING: 0
NAUSEA: 1
ABDOMINAL DISTENTION: 0
VOMITING: 1
CONSTIPATION: 0
RHINORRHEA: 0
TROUBLE SWALLOWING: 0
NAUSEA: 1
RECTAL PAIN: 0
EYE DISCHARGE: 0
BLOOD IN STOOL: 0
EYE PAIN: 0
VOMITING: 1
SORE THROAT: 0
RHINORRHEA: 0
BACK PAIN: 0
DIARRHEA: 1
ABDOMINAL PAIN: 1
ABDOMINAL PAIN: 1

## 2019-06-10 ASSESSMENT — PATIENT HEALTH QUESTIONNAIRE - PHQ9
SUM OF ALL RESPONSES TO PHQ9 QUESTIONS 1 & 2: 2
2. FEELING DOWN, DEPRESSED OR HOPELESS: 0
SUM OF ALL RESPONSES TO PHQ QUESTIONS 1-9: 2
1. LITTLE INTEREST OR PLEASURE IN DOING THINGS: 2
SUM OF ALL RESPONSES TO PHQ QUESTIONS 1-9: 2

## 2019-06-10 ASSESSMENT — PAIN SCALES - GENERAL
PAINLEVEL_OUTOF10: 9

## 2019-06-10 ASSESSMENT — PAIN DESCRIPTION - DESCRIPTORS: DESCRIPTORS: SQUEEZING

## 2019-06-10 NOTE — TELEPHONE ENCOUNTER
Notify patient. Flagyl e-scribed to pharmacy. Avoid alcohol during and up to 24 hours after medication done.

## 2019-06-10 NOTE — PROGRESS NOTES
[id number] [facility] [address] [place]  [address] [facility]  Dept: [phone]  Dept Fax: [phone]  Loc: [facility]       Chief Complaint   Patient presents with    Follow-up     ED abd pain    Abdominal Pain    Emesis    Established New Doctor       Nurses Notes reviewed and I agree except as noted in the HPI. HISTORY OF PRESENT ILLNESS   Isaias Caputo is a 35 y.o. female who presents for ER f/u and to establish care. Multiple ER visits for abdominal pain/chronic pancreatitis - Has been to [facility] and [facility].  Amandeep Hu had like 3 cat scans. \"      Narrative   PROCEDURE: CT ABDOMEN PELVIS WO CONTRAST       CLINICAL INFORMATION: ABDOMINAL PAIN .       COMPARISON: None.       TECHNIQUE: Axial 5 mm CT images were obtained through the abdomen and pelvis. No contrast was given. Coronal reconstructions were obtained.       All CT scans at this facility use dose modulation, iterative reconstruction, and/or weight-based dosing when appropriate to reduce radiation dose to as low as reasonably achievable.       FINDINGS:       Lung bases are clear. Cholecystectomy. Spleen, pancreas adrenal glands and liver are unremarkable. Vague heterogeneous attenuation of the right kidney which could suggest intrarenal cysts. Evaluation is limited without intravenous contrast. Correlation with urinalysis is advised. Small nonobstructing left intrarenal calculi. Fat-containing umbilical hernia. There is moderate stool in the colon. Normal appendix. SI joints are symmetric. Air within the posterior right gluteal soft tissues suggesting injection site. No acute osseous findings.               Impression       1. Probable right renal cortical cysts incompletely evaluated on noncontrast examination. Correlation with urinalysis is advised. 2. Small, nonobstructing left intrarenal calculi. No hydronephrosis. 3. Moderate scattered stool in the colon. No bowel wall thickening or obstruction.                   **This report has been created using voice recognition software. It may contain minor errors which are inherent in voice recognition technology. **       Final report electronically signed by Dr. Mark Kolb on 6/7/2019 5:39 PM         Chronic pancreatitis - Onset age 23. Has had 7 ERCP. PSH of cholecystectomy. Most recent onset 5/27/19. Has had constant nausea/vomiting and diarrhea since then. No black/blood stools. She is having 6+ episodes of loose/watery stool per day. She states s/s onset after eating a deviled egg. Has had no stool testing. GERD - Since age 23. Has been on PPI sporadically. No issues swallowing. Insominia - Has troubles falling/staying asleep. Onset around 3 years ago. Sleep is not restorative. No snoring/apnea. Can't stop worrying/thinking about things. Had tried Melatonin. States she was taking 30 mg. Depression/Anxiety - States for past 3 years. No specific triggers. \"just my life. \" Has never been on medication. She did go to counseling once/week for 1.5 years, then once every 2 weeks. Very little depression. States more anxiety. Endometriosis - Diagnosed at age 29. Had a total hysterectomy. States she has cramping to lower abdomen daily. Had been followed by Ob[facility]N on [place]. Recently moved to [place] from [place]. She was working nights while in [place]. She starts day shift in [place] on Wednesday. Regular diet. Tries to avoid saucy/greasy stuff. \"I try to stay bland. \"  Drinking Pardeep-aid and water most days. No exercise outside. No tobacco or alcohol. Will smoke marijuana on occasion. No additional complaints. REVIEW OF SYSTEMS     Review of Systems   Constitutional: Positive for appetite change and fatigue. Negative for chills, diaphoresis, fever and unexpected weight change.    HENT: Negative for congestion, ear pain, rhinorrhea, sore throat and trouble swallowing. Eyes: Negative for visual disturbance. Respiratory: Negative for cough and shortness of breath. Cardiovascular: Negative for chest pain, palpitations and leg swelling. Gastrointestinal: Positive for abdominal pain, diarrhea, nausea and vomiting. Negative for abdominal distention, anal bleeding, blood in stool, constipation and rectal pain. Endocrine: Negative for polydipsia, polyphagia and polyuria. Genitourinary: Positive for pelvic pain (endometriosis). Negative for decreased urine volume, difficulty urinating, dysuria, flank pain, frequency, genital sores, hematuria, menstrual problem, urgency, vaginal bleeding, vaginal discharge and vaginal pain. Musculoskeletal: Negative for arthralgias, joint swelling, neck pain and neck stiffness. Neurological: Negative for dizziness, light-headedness and headaches. Hematological: Negative for adenopathy. Psychiatric/Behavioral: Positive for decreased concentration and sleep disturbance. Negative for confusion, self-injury and suicidal ideas. The patient is nervous/anxious. PAST MEDICAL HISTORY         Diagnosis Date    Endometriosis     Pancreatitis        SURGICAL HISTORY     Patient  has a past surgical history that includes Upper gastrointestinal endoscopy; Cholecystectomy; ERCP; Tonsillectomy; and Hysterectomy, vaginal.    CURRENT MEDICATIONS       Outpatient Medications Prior to Visit   Medication Sig Dispense Refill    promethazine (PHENERGAN) 25 MG tablet Take 1 tablet by mouth every 6 hours as needed for Nausea 24 tablet 0    sucralfate (CARAFATE) 1 GM/10ML suspension Take 10 mLs by mouth 4 times daily 1200 mL 3    pantoprazole (PROTONIX) 40 MG tablet Take 1 tablet by mouth daily for 30 doses 30 tablet 0     No facility-administered medications prior to visit. ALLERGIES     Patient is is allergic to ambien [zolpidem]; morphine; and zofran [ondansetron hcl].     FAMILY HISTORY Patient's family history is not on file. SOCIAL HISTORY     Patient  reports that she has never smoked. She has never used smokeless tobacco. She reports that she has current or past drug history. Drug: Marijuana. She reports that she does not drink alcohol. PHYSICAL EXAM     VITALS  BP: (!) 127/93, Temp: 98.2 °F (36.8 °C), Pulse: 121, Resp: 12, SpO2: 97 %  Physical Exam   Constitutional: She is oriented to person, place, and time. She appears well-developed and well-nourished. She is cooperative. Non-toxic appearance. She has a sickly appearance. She does not appear ill. No distress. HENT:   Head: Normocephalic and atraumatic. Right Ear: Hearing, tympanic membrane, external ear and ear canal normal.   Left Ear: Hearing, tympanic membrane, external ear and ear canal normal.   Nose: Nose normal. No mucosal edema or rhinorrhea. Right sinus exhibits no maxillary sinus tenderness and no frontal sinus tenderness. Left sinus exhibits no maxillary sinus tenderness and no frontal sinus tenderness. Mouth/Throat: Uvula is midline, oropharynx is clear and moist and mucous membranes are normal. No trismus in the jaw. No uvula swelling. No oropharyngeal exudate, posterior oropharyngeal edema, posterior oropharyngeal erythema or tonsillar abscesses. Tonsils are 0 on the right. Tonsils are 0 on the left. Eyes: Pupils are equal, round, and reactive to light. Conjunctivae and EOM are normal. Right conjunctiva is not injected. Right conjunctiva has no hemorrhage. Left conjunctiva is not injected. Left conjunctiva has no hemorrhage. No scleral icterus. Neck: Trachea normal and normal range of motion. Neck supple. No thyromegaly present. Cardiovascular: Regular rhythm and normal heart sounds. No extrasystoles are present. Tachycardia present. PMI is not displaced. Exam reveals no gallop and no friction rub. No murmur heard. Pulses:       Radial pulses are 2+ on the right side, and 2+ on the left side. Dorsalis pedis pulses are 2+ on the right side, and 2+ on the left side. Posterior tibial pulses are 2+ on the right side, and 2+ on the left side. Pulmonary/Chest: Effort normal and breath sounds normal. No respiratory distress. Abdominal: Soft. Normal appearance. She exhibits no distension and no mass. Bowel sounds are decreased. There is no hepatosplenomegaly. There is tenderness in the right upper quadrant and epigastric area. There is no rigidity, no guarding and no tenderness at McBurney's point. No hernia. Musculoskeletal:        Right lower leg: She exhibits swelling. She exhibits no edema. Left lower leg: She exhibits swelling. She exhibits no edema. Lymphadenopathy:        Head (right side): No submental, no submandibular, no tonsillar, no preauricular, no posterior auricular and no occipital adenopathy present. Head (left side): No submental, no submandibular, no tonsillar, no preauricular, no posterior auricular and no occipital adenopathy present. She has no cervical adenopathy. Right: No supraclavicular adenopathy present. Left: No supraclavicular adenopathy present. Neurological: She is alert and oriented to person, place, and time. She is not disoriented. No cranial nerve deficit or sensory deficit. Coordination and gait normal. GCS eye subscore is 4. GCS verbal subscore is 5. GCS motor subscore is 6. CN II-XII grossly intact   Skin: Skin is warm, dry and intact. Capillary refill takes less than 2 seconds. No bruising, no ecchymosis, no petechiae and no rash noted. She is not diaphoretic. No pallor. Skin warm and dry to touch, no rashes noted on exposed surfaces. Psychiatric: Her speech is normal and behavior is normal. Judgment and thought content normal. Her mood appears anxious. Cognition and memory are normal. She expresses no homicidal and no suicidal ideation. Nursing note and vitals reviewed.       DIAGNOSTIC RESULTS   Labs:No results found for this visit on 06/10/19. IMAGING:  No orders to display       No images are attached to the encounter or orders placed in the encounter. CLINICAL COURSE COURSE:     Vitals:    06/10/19 0832 06/10/19 0837   BP: (!) 127/100 (!) 127/93   Pulse: 121    Resp: 12    Temp: 98.2 °F (36.8 °C)    TempSrc: Oral    SpO2: 97%    Weight: 211 lb 6.4 oz (95.9 kg)    Height: 5' 9.02\" (1.753 m)          PROCEDURES:  None  FINAL IMPRESSION      1. Chronic pancreatitis, unspecified pancreatitis type ([facility] [address]    2. Need for prophylactic vaccination against diphtheria-tetanus-pertussis (DTP)    3. Encounter for screening for HIV    4. Unknown varicella vaccination status    5. Anemia, unspecified type    6. Acute gastroenteritis    7. Fatigue, unspecified type    8. Endometriosis    9. Encounter for screening for lipid disorder    10. Mixed anxiety and depressive disorder    11. Insomnia, unspecified type    12. Gastroesophageal reflux disease without esophagitis         DISPOSITION/PLAN     Chronic pancreatitis/GERD - referral to GI. Most recent onset after eating. Denies alcohol. No acute abdomen. Stool testing ordered. Rx Bentyl and Simethicone. Endometriosis - Had seen Dr. Vanessa Jaquez in past, referral placed. Mixed anxiey/depression - No suicidal/homicidal ideation. Rx Zoloft. Referral to [facility]. Insomnia - Stop melatonin. Starting patient on Zoloft. Consider alternate treatment once anxiety/depression doing better. PATIENT REFERRED TO:    Follow up with Rut Hemphill scheduled for 7/12.     DISCHARGE MEDICATIONS:  New Prescriptions    DICYCLOMINE (BENTYL) 10 MG CAPSULE    Take 1 capsule by mouth 4 times daily    SERTRALINE (ZOLOFT) 50 MG TABLET    Take 1 tablet by mouth daily    SIMETHICONE (MYLICON) 80 MG CHEWABLE TABLET    Take 1 tablet by mouth every 6 hours as needed for Flatulence         Electronically signed by DONALD Chi CNP on 6/10/2019 at 11:37 AM

## 2019-06-10 NOTE — PATIENT INSTRUCTIONS
Patient Education        Gastritis: Care Instructions  Your Care Instructions    Gastritis is a sore and upset stomach. It happens when something irritates the stomach lining. Many things can cause it. These include an infection such as the flu or something you ate or drank. Medicines or a sore on the lining of the stomach (ulcer) also can cause it. Your belly may bloat and ache. You may belch, vomit, and feel sick to your stomach. You should be able to relieve the problem by taking medicine. And it may help to change your diet. If gastritis lasts, your doctor may prescribe medicine. Follow-up care is a key part of your treatment and safety. Be sure to make and go to all appointments, and call your doctor if you are having problems. It's also a good idea to know your test results and keep a list of the medicines you take. How can you care for yourself at home? · If your doctor prescribed antibiotics, take them as directed. Do not stop taking them just because you feel better. You need to take the full course of antibiotics. · Be safe with medicines. If your doctor prescribed medicine to decrease stomach acid, take it as directed. Call your doctor if you think you are having a problem with your medicine. · Do not take any other medicine, including over-the-counter pain relievers, without talking to your doctor first.  · If your doctor recommends over-the-counter medicine to reduce stomach acid, such as Pepcid AC, Prilosec, Tagamet HB, or Zantac 75, follow the directions on the label. · Drink plenty of fluids (enough so that your urine is light yellow or clear like water) to prevent dehydration. Choose water and other caffeine-free clear liquids. If you have kidney, heart, or liver disease and have to limit fluids, talk with your doctor before you increase the amount of fluids you drink. · Limit how much alcohol you drink. · Avoid coffee, tea, cola drinks, chocolate, and other foods with caffeine.  They increase stomach acid. When should you call for help? Call 911 anytime you think you may need emergency care. For example, call if:    · You vomit blood or what looks like coffee grounds.     · You pass maroon or very bloody stools.    Call your doctor now or seek immediate medical care if:    · You start breathing fast and have not produced urine in the last 8 hours.     · You cannot keep fluids down.    Watch closely for changes in your health, and be sure to contact your doctor if:    · You do not get better as expected. Where can you learn more? Go to https://[email or web address].eTelemetry. org and sign in to your [employer] account. Enter [id number] in the [employer] box to learn more about \"Gastritis: Care Instructions. \"     If you do not have an account, please click on the \"Sign Up Now\" link. Current as of: November 7, 2018  Content Version: 12.0  © 8693-3437 Healthwise, Incorporated. Care instructions adapted under license by [facility] ([facility]). If you have questions about a medical condition or this instruction, always ask your healthcare professional. [place] [address] any warranty or liability for your use of this information.

## 2019-06-10 NOTE — PROGRESS NOTES
Health Maintenance Due   Topic Date Due    Varicella Vaccine (1 of 2 - 13+ 2-dose series) Ordered titres    HIV screen  ordered    DTaP/Tdap/Td vaccine (1 - Tdap) refused    Cervical cancer screen  hysterectomy

## 2019-06-10 NOTE — ED PROVIDER NOTES
back pain, joint swelling and neck pain. Skin: Negative for pallor and rash. Neurological: Negative for dizziness, syncope, weakness, light-headedness, numbness and headaches. Hematological: Negative for adenopathy. Psychiatric/Behavioral: Negative for confusion and suicidal ideas. The patient is not nervous/anxious. PAST MEDICAL HISTORY    has a past medical history of Endometriosis and Pancreatitis. SURGICAL HISTORY      has a past surgical history that includes Upper gastrointestinal endoscopy; Cholecystectomy; ERCP; Tonsillectomy; and Hysterectomy, vaginal.    CURRENT MEDICATIONS       Discharge Medication List as of 6/10/2019 11:13 PM      CONTINUE these medications which have NOT CHANGED    Details   simethicone (MYLICON) 80 MG chewable tablet Take 1 tablet by mouth every 6 hours as needed for Flatulence, Disp-20 tablet, R-0Normal      promethazine (PHENERGAN) 25 MG tablet Take 1 tablet by mouth every 6 hours as needed for Nausea, Disp-24 tablet, R-0Print      sucralfate (CARAFATE) 1 GM/10ML suspension Take 10 mLs by mouth 4 times daily, Disp-1200 mL, R-3Print      pantoprazole (PROTONIX) 40 MG tablet Take 1 tablet by mouth daily for 30 doses, Disp-30 tablet, R-0Print      dicyclomine (BENTYL) 10 MG capsule Take 1 capsule by mouth 4 times daily, Disp-120 capsule, R-0Normal      sertraline (ZOLOFT) 50 MG tablet Take 1 tablet by mouth daily, Disp-30 tablet, R-1Normal      metroNIDAZOLE (FLAGYL) 500 MG tablet Take 1 tablet by mouth 3 times daily for 10 days, Disp-30 tablet, R-0Normal             ALLERGIES     is allergic to ambien [zolpidem]; morphine; and zofran [ondansetron hcl]. FAMILY HISTORY     has no family status information on file. family history is not on file. SOCIAL HISTORY    reports that she has never smoked. She has never used smokeless tobacco. She reports that she has current or past drug history. Drug: Marijuana.  She reports that she does not drink alcohol. PHYSICAL EXAM     INITIAL VITALS:  height is 5' 9\" (1.753 m) and weight is 211 lb (95.7 kg). Her oral temperature is 98.2 °F (36.8 °C). Her blood pressure is 117/86 and her pulse is 109. Her respiration is 20 and oxygen saturation is 98%. Physical Exam   Constitutional: She is oriented to person, place, and time. Vital signs are normal. She appears well-developed and well-nourished. Non-toxic appearance. No distress. Cries tears   HENT:   Head: Normocephalic and atraumatic. Right Ear: Hearing normal.   Left Ear: Hearing normal.   Nose: Nose normal. No rhinorrhea. Mouth/Throat: Uvula is midline, oropharynx is clear and moist and mucous membranes are normal.   Eyes: Pupils are equal, round, and reactive to light. Conjunctivae and EOM are normal. No scleral icterus. Neck: No JVD present. No neck rigidity. No tracheal deviation present. Cardiovascular: Normal rate, regular rhythm and normal heart sounds. Pulmonary/Chest: Effort normal and breath sounds normal. No respiratory distress. She has no decreased breath sounds. She has no wheezes. Abdominal: Soft. Normal appearance. She exhibits no distension and no pulsatile midline mass. Bowel sounds are increased. There is no tenderness. There is no rigidity, no rebound, no guarding, no CVA tenderness, no tenderness at McBurney's point and negative Daniels's sign. No hernia. No abdominal tenderness noted with distraction   Musculoskeletal: Normal range of motion. Lymphadenopathy:     She has no cervical adenopathy. Neurological: She is alert and oriented to person, place, and time. She has normal strength. Gait normal.   Skin: Skin is warm, dry and intact. No rash noted. She is not diaphoretic. No pallor. Psychiatric: Her speech is normal and behavior is normal. Thought content normal. Her mood appears anxious. Nursing note and vitals reviewed.       DIFFERENTIAL DIAGNOSIS:   Including but not limited to: pancreatitis, C-diff gastroenteritis, dehydration    DIAGNOSTIC RESULTS     EKG: All [facility]'s [facility] who either signs or Co-signs this chart in the absence of a cardiologist.  Pending    RADIOLOGY: non-plain film images(s) such as CT,Ultrasound and MRI are read by the radiologist.  Plain radiographic images are visualized and preliminarily interpreted by the emergency physician unless otherwise stated below. No orders to display       LABS:   Labs Reviewed   CBC WITH AUTO DIFFERENTIAL - Abnormal; Notable for the following components:       Result Value    Hematocrit 36.1 (*)     All other components within normal limits   BASIC METABOLIC PANEL - Abnormal; Notable for the following components:    Potassium 3.3 (*)     CO2 21 (*)     All other components within normal limits   HEPATIC FUNCTION PANEL - Abnormal; Notable for the following components:    ALT 7 (*)     All other components within normal limits   LIPASE - Abnormal; Notable for the following components:    Lipase 113.4 (*)     All other components within normal limits   MAGNESIUM   HCG, SERUM, QUALITATIVE   ETHANOL   PROCALCITONIN   LACTATE, SEPSIS   ANION GAP   OSMOLALITY   GLOMERULAR FILTRATION RATE, ESTIMATED   URINE DRUG SCREEN   URINALYSIS WITH MICROSCOPIC       EMERGENCY DEPARTMENT COURSE:   Vitals:    Vitals:    06/10/19 1759 06/10/19 1956 06/10/19 2225   BP: 136/79 111/79 117/86   Pulse: 111 102 109   Resp: 20     Temp: 98.2 °F (36.8 °C)     TempSrc: Oral     SpO2: 97%  98%   Weight: 211 lb (95.7 kg)     Height: 5' 9\" (1.753 m)       The patient was seen in emergency room by me. Old records were reviewed. Physical exam revealed tearful patient with no abdominal tenderness with distraction. Appropriate labs and imaging were ordered. The patient was closely observed and vital signs monitored. There was significant difficulty obtaining lab work and establishing an IV line.   This patient was discussed with my attending physicians Dr. Ayanna Yu and Dr. Julio Cesar Álvarez. Dr. JulioC esar Álvarez was enlisted to establish a central line. This was done successfully and laboratory work was sent off. This patient was then turned over to Olivia Mcnally NP pending laboratory work, imaging, and final disposition. CRITICAL CARE:   None    CONSULTS:  None    PROCEDURES:  None    FINAL IMPRESSION      1. Chronic abdominal pain    2. C. difficile colitis          DISPOSITION/PLAN     1. Chronic abdominal pain    2. C. difficile colitis        (Please note that portions of this note were completed with a voice recognition program.  Efforts were made to edit the dictations but occasionally words are mis-transcribed.)    Scribe:  Troy Solis 6/10/19 6:34 PM Scribing for and in the presence of SOCRATES Segura. Signed by: Yaz Govea 06/14/19 9:13 AM    Provider:  I personally performed the services described in the documentation, reviewed and edited the documentation which was dictated to the scribe in my presence, and it accurately records my words and actions.     SOCRATES Segura 06/14/19 9:13 AM    SOCRATES Segura [place]  06/14/19 [id number]

## 2019-06-10 NOTE — ED NOTES
Pt presents with upper abd pain since [place] Day. Pt reports she has vomiting and diarrhea daily. Pt reports she was seen at [facility], [facility], and [facility] for this complaint over he past few weeks.           Tommy Zamora RN  06/10/19 5325

## 2019-06-11 ENCOUNTER — TELEPHONE (OUTPATIENT)
Dept: FAMILY MEDICINE CLINIC | Age: 34
End: 2019-06-11

## 2019-06-11 NOTE — ED NOTES
Pt and pt's mother informed of CT scan order. Pt's mother agitated stating Tae Briscoe has had 3 ct scans over the past 2 weeks, she needs an MRI\". Provider notified.       Rigoberto Harrington RN  06/10/19 6774

## 2019-06-11 NOTE — ED NOTES
Evan Garg NP informed pt she has c-diff stool infection based on recent stool sample result.       Wil Wilkerson RN  06/10/19 5049

## 2019-06-11 NOTE — LETTER
[address] [facility]  [address], [address]  Phone: [phone]  Fax: [phone]    June 11, 2019    Elsy MezaearnestCapital Region Medical Centerrashid SánchezSum[facility]    Dear Jeanine Mobley,    This letter is regarding your Emergency Department (ED) visit at [facility] on 6/10/19. Chayito Rashid wanted to make sure that you understand your discharge instructions and that you were able to fill any prescriptions that may have been ordered for you. Please contact the office at the above phone number if the ED advised you to follow up with Chayito Rashid, or if you have any further questions or needs. Also did you know -   *Visiting the ED for a non-emergency could result in higher co-pays than you would normally be subject to paying? *You can call your doctor even after hours so they can direct you to the most appropriate care. [facility]) practices can often offer you an appointment on the same day that you call. *We have some [facility] offices that offer Walk-in appointments; check our website for availability in your community, www. [employer].      *Evisits are now available for patients for $36 through [email or web address] for certain conditions:  * Sinus, cold and or cough       * Diarrhea            * Headache  * Heartburn                                * Poison Georgette          * Back pain     * Urinary problems                         If you do not have finalsitehart and are interested, please contact the office and a staff member may assist you or go to [email or web address]     Sincerely,   Yeison Morel DO and your [facility]

## 2019-06-11 NOTE — ED NOTES
Pt provided with warm blankets. Pt agreed to take medications ordered IM. Pt reports \"that is not the anti-nausea medication I usually take\" Pt educated about Reglan order. Mother at bedside states \"It better be as good as Phenergan\".       Deni Reyna RN  06/10/19 4749

## 2019-06-11 NOTE — ED NOTES
Pt reports pain medication helped decrease her pain. Pt appears relaxed in bed, reports she is nauseated. Pt's mother asking Juma Martinez are you not pumping her full of antibiotics since she has c-diff\". Pt and pt's mother educated. Rojelio Nicolas notified of pt feeling nauseated.       Shabnam Santos RN  06/10/19 [address] Maria Del Roasrio Vasques RN  06/10/19 [address] Maria Del Rosario Vasques RN  06/10/19 4426

## 2019-06-11 NOTE — ED NOTES
CT scan called to inform of order to cancel CT abd per Chantelle Allison NP.       Ana Mijares RN  06/10/19 4027

## 2019-06-11 NOTE — ED NOTES
Dr. Raffy Junior. In to attempt peripheral IV x 2 with ultrasound and 1 attempt left EJ unsuccessful.       Austen Calvert, RN  06/10/19 6690

## 2019-06-15 ENCOUNTER — HOSPITAL ENCOUNTER (EMERGENCY)
Age: 34
Discharge: HOME | End: 2019-06-15
Attending: EMERGENCY MEDICINE
Payer: MEDICAID

## 2019-06-15 VITALS
RESPIRATION RATE: 20 BRPM | WEIGHT: 211 LBS | DIASTOLIC BLOOD PRESSURE: 82 MMHG | HEIGHT: 69 IN | TEMPERATURE: 98.6 F | SYSTOLIC BLOOD PRESSURE: 118 MMHG | BODY MASS INDEX: 31.25 KG/M2 | HEART RATE: 98 BPM | OXYGEN SATURATION: 97 %

## 2019-06-15 DIAGNOSIS — R19.7 DIARRHEA OF PRESUMED INFECTIOUS ORIGIN: ICD-10-CM

## 2019-06-15 DIAGNOSIS — R11.0 NAUSEA: Primary | ICD-10-CM

## 2019-06-15 PROCEDURE — 96372 THER/PROPH/DIAG INJ SC/IM: CPT

## 2019-06-15 PROCEDURE — 6360000002 HC RX W HCPCS: Performed by: EMERGENCY MEDICINE

## 2019-06-15 PROCEDURE — 2709999900 HC NON-CHARGEABLE SUPPLY

## 2019-06-15 PROCEDURE — 99284 EMERGENCY DEPT VISIT MOD MDM: CPT

## 2019-06-15 PROCEDURE — 96374 THER/PROPH/DIAG INJ IV PUSH: CPT

## 2019-06-15 PROCEDURE — 6370000000 HC RX 637 (ALT 250 FOR IP): Performed by: EMERGENCY MEDICINE

## 2019-06-15 RX ORDER — PROMETHAZINE HYDROCHLORIDE 25 MG/1
25 SUPPOSITORY RECTAL EVERY 6 HOURS PRN
Qty: 20 SUPPOSITORY | Refills: 0 | Status: SHIPPED | OUTPATIENT
Start: 2019-06-15 | End: 2019-06-22

## 2019-06-15 RX ORDER — PROMETHAZINE HYDROCHLORIDE 25 MG/ML
25 INJECTION, SOLUTION INTRAMUSCULAR; INTRAVENOUS ONCE
Status: COMPLETED | OUTPATIENT
Start: 2019-06-15 | End: 2019-06-15

## 2019-06-15 RX ORDER — HALOPERIDOL 5 MG/ML
2 INJECTION INTRAMUSCULAR ONCE
Status: COMPLETED | OUTPATIENT
Start: 2019-06-15 | End: 2019-06-15

## 2019-06-15 RX ORDER — KETOROLAC TROMETHAMINE 30 MG/ML
30 INJECTION, SOLUTION INTRAMUSCULAR; INTRAVENOUS ONCE
Status: COMPLETED | OUTPATIENT
Start: 2019-06-15 | End: 2019-06-15

## 2019-06-15 RX ORDER — PROMETHAZINE HYDROCHLORIDE 25 MG/1
25 TABLET ORAL EVERY 6 HOURS PRN
COMMUNITY
End: 2019-07-09 | Stop reason: SDUPTHER

## 2019-06-15 RX ORDER — METRONIDAZOLE 500 MG/1
500 TABLET ORAL ONCE
Status: COMPLETED | OUTPATIENT
Start: 2019-06-15 | End: 2019-06-15

## 2019-06-15 RX ADMIN — METRONIDAZOLE 500 MG: 500 TABLET, FILM COATED ORAL at 11:46

## 2019-06-15 RX ADMIN — HALOPERIDOL LACTATE 2 MG: 5 INJECTION INTRAMUSCULAR at 09:33

## 2019-06-15 RX ADMIN — HALOPERIDOL LACTATE 2 MG: 5 INJECTION INTRAMUSCULAR at 11:08

## 2019-06-15 RX ADMIN — PROMETHAZINE HYDROCHLORIDE 25 MG: 25 INJECTION INTRAMUSCULAR; INTRAVENOUS at 09:32

## 2019-06-15 RX ADMIN — KETOROLAC TROMETHAMINE 30 MG: 30 INJECTION, SOLUTION INTRAMUSCULAR at 11:47

## 2019-06-15 ASSESSMENT — ENCOUNTER SYMPTOMS
ABDOMINAL PAIN: 1
EYE DISCHARGE: 0
BACK PAIN: 0
RHINORRHEA: 0
WHEEZING: 0
COUGH: 0
NAUSEA: 1
EYE PAIN: 0
VOMITING: 1
SORE THROAT: 0
SHORTNESS OF BREATH: 0
DIARRHEA: 1

## 2019-06-15 ASSESSMENT — PAIN DESCRIPTION - PAIN TYPE: TYPE: ACUTE PAIN

## 2019-06-15 ASSESSMENT — PAIN DESCRIPTION - LOCATION: LOCATION: ABDOMEN

## 2019-06-15 ASSESSMENT — PAIN SCALES - GENERAL: PAINLEVEL_OUTOF10: 8

## 2019-06-15 NOTE — ED NOTES
Patient presents to the emergency dept with c/o ongoing abdominal pain and vomiting. Patient reports that she has a long history of pancreatitis and that she just recently moved back up here from [place] and is trying to get set up with a GI Specialist. Patient was given Fentanyl 20 mcg IM enroute from EMS.       Indiana De Los Santos RN  06/15/19 5679

## 2019-06-15 NOTE — ED NOTES
Bed: 002A  Expected date: 6/15/19  Expected time:   Means of arrival: [facility]  Comments:      Irma Witt RN  06/15/19 7974

## 2019-06-15 NOTE — ED NOTES
Lab phlebotomist to attempt to obtain blood work, medicating with second dose of Haldol for the nausea and then will begin to start PO challenge. No vomiting or diarrhea noted while patient has been in the emergency dept.      Herminia Álvarez RN  06/15/19 2267

## 2019-06-15 NOTE — ED PROVIDER NOTES
[facility]  eMERGENCY dEPARTMENT eNCOUnter          CHIEF COMPLAINT       Chief Complaint   Patient presents with    Abdominal Pain    Emesis       Nurses Notes reviewed and I agreeexcept as noted in the HPI. HISTORY OF PRESENT ILLNESS    Jennifer Moss is a 35 y.o. female who presents to the ED for abdominal pain and emesis. Patient has been seen in [facility] 3 times within the last 8 days. She states that she was diagnosed with pancreatitis at the age of 23 and has had intermittent flare ups ever since then. The patient was seen by her PCP on 06/10/19 and was diagnosed with CDiff and was given a prescription for Flagyl. Patient states that she \"must have got C diff from the hospital\" but when questioned about recent antibiotics, admits that she was on Cipro recently. She was seen in the ED that night again before picking up her prescription. She was discharged home with strict instructions on avoiding Pepto bismol and Imodium and to take her Flagyl. Today she states that she has been compliant with her Flagyl for 5 days, but is having no relief. She still complains of nausea, vomiting \"a little white stuff\", and diarrhea. She states that she has been unable to keep medications down due to the vomiting. Then later patient reports she actually only vomited once today. She denies fever at home. She denies blood in her stool or emesis. She also has a follow up appointment with Dr. Nayla Fairbanks (GI) on 06/27/19. She states next that she was recently diagnosed with a peptic ulcer and gastritis by Dr. Blair Bumpers, a doctor that she followed with in [place]. She states that she recently moved to [facility] from [place], [place]. Patient reports that she has not been seen in the ED in [place], [place] for at least 2-3 years because she had not had flare ups of her pancreatitis or abdominal pain in years.  Patient states that she has not taken her prescribed Protonix because she forgot it in [place], and has not taken her Carafate because the \"pill is too big. \"  She denies any other chronic medications prescribed in the past. She was given these prescriptions in liquid form after being seen in the ED on the 06/27/19. Patient also states that she was seen at [facility] on 05/27/19 for the same symptoms. She claims that she was diagnosed with a UTI although she never had her urine tested. An attempt was made to obtain records from [facility], but none were found. Patient states that this is due to a name change. She was discharged and given a prescription for Vicodin, Phenergan, and an antibiotic. Patient has a history of a hysterectomy and a cholecystectomy. She states that she has had 7 ERCP's done in [place]. Patient is unable to state the name of the hospital in [facility] where she states she had most of her care in recent years with her GI specialist.  She also reports she is a difficult stick. She rates her current abdominal pain at a 8/10 in severity. Patient denies any further complaints at initial encounter. REVIEW OF SYSTEMS     Review of Systems   Constitutional: Positive for fever (100.4 per EMS report. ED temp 98.6). Negative for appetite change, chills and fatigue. HENT: Negative for congestion, ear pain, rhinorrhea and sore throat. Eyes: Negative for pain, discharge and visual disturbance. Respiratory: Negative for cough, shortness of breath and wheezing. Cardiovascular: Negative for chest pain, palpitations and leg swelling. Gastrointestinal: Positive for abdominal pain, diarrhea, nausea and vomiting. Genitourinary: Negative for difficulty urinating, dysuria, hematuria and vaginal discharge. Musculoskeletal: Negative for arthralgias, back pain, joint swelling and neck pain. Skin: Negative for pallor and rash. Neurological: Negative for dizziness, syncope, weakness, light-headedness, numbness and headaches. Hematological: Negative for adenopathy.    Psychiatric/Behavioral: Negative for confusion and suicidal ideas. The patient is not nervous/anxious. All other systems reviewed and are negative. PAST MEDICAL HISTORY    has a past medical history of Endometriosis and Pancreatitis. SURGICAL HISTORY      has a past surgical history that includes Upper gastrointestinal endoscopy; Cholecystectomy; ERCP; Tonsillectomy; and Hysterectomy, vaginal.    CURRENT MEDICATIONS       Discharge Medication List as of 6/15/2019 12:20 PM      CONTINUE these medications which have NOT CHANGED    Details   promethazine (PHENERGAN) 25 MG tablet Take 25 mg by mouth every 6 hours as needed for NauseaHistorical Med      dicyclomine (BENTYL) 10 MG capsule Take 1 capsule by mouth 4 times daily, Disp-120 capsule, R-0Normal      simethicone (MYLICON) 80 MG chewable tablet Take 1 tablet by mouth every 6 hours as needed for Flatulence, Disp-20 tablet, R-0Normal      sertraline (ZOLOFT) 50 MG tablet Take 1 tablet by mouth daily, Disp-30 tablet, R-1Normal      metroNIDAZOLE (FLAGYL) 500 MG tablet Take 1 tablet by mouth 3 times daily for 10 days, Disp-30 tablet, R-0Normal      sucralfate (CARAFATE) 1 GM/10ML suspension Take 10 mLs by mouth 4 times daily, Disp-1200 mL, R-3Print      pantoprazole (PROTONIX) 40 MG tablet Take 1 tablet by mouth daily for 30 doses, Disp-30 tablet, R-0Print             ALLERGIES     is allergic to ambien [zolpidem]; morphine; and zofran [ondansetron hcl]. FAMILY HISTORY     has no family status information on file. family history is not on file. SOCIAL HISTORY      reports that she has never smoked. She has never used smokeless tobacco. She reports that she has current or past drug history. Drug: Marijuana. She reports that she does not drink alcohol. PHYSICAL EXAM     INITIAL VITALS:  height is 5' 9\" (1.753 m) and weight is 211 lb (95.7 kg). Her oral temperature is 98.6 °F (37 °C). Her blood pressure is 118/82 and her pulse is 98. Her respiration is 20 and oxygen saturation is 97%. DIFFERENTIAL DIAGNOSIS:   Differential Dx Lists - I consider the following to be a partial list of the possible etiologies for the patient's symptoms and based on my clinical findings as well and are part of my medical decision making:    Abdominal pain: c diff colitis, dehydration, ?narcotic seeking behavior. less likely: appendicitis, cholecystitis, cholelithiasis, AAA, gastroenteritis, enteritis, gastritis, hepatitis, pancreatitis, UTI, diverticulitis, SBO, bowel perforation, and others      DIAGNOSTIC RESULTS     EKG: All EKG's are interpreted by the Emergency Department Physician who either signs or Co-signsthis chart in the absence of a cardiologist.  None    RADIOLOGY: non-plain film images(s) such as CT,Ultrasound and MRI are read by the radiologist.    No orders to display     [] Visualized and interpreted by me   [] Radiologist's Wet Read Report Reviewed   [] Discussed withRadiologist.    LABS:   Labs Reviewed   CBC WITH AUTO DIFFERENTIAL   COMPREHENSIVE METABOLIC PANEL W/ REFLEX TO MG FOR LOW K   LIPASE   LACTIC ACID, PLASMA   PREGNANCY, URINE   URINE RT REFLEX TO CULTURE   URINE DRUG SCREEN       EMERGENCY DEPARTMENT COURSE:   Vitals:    Vitals:    06/15/19 0803 06/15/19 1107   BP: 119/79 118/82   Pulse: 103 98   Resp: 22 20   Temp: 98.6 °F (37 °C)    TempSrc: Oral    SpO2: 97% 97%   Weight: 211 lb (95.7 kg)    Height: 5' 9\" (1.753 m)        10:40 AM I spoke with patient's nurse who informed me that the IV team had an IV line established, but the patient stated that it caused pain so she asked that it be removed. I was also informed that the patient declined blood being drawn and would not consent to further attempts by RN or phlebotomy. When I asked the patient about this, she states that none of RN's report is true, and that none of those things involving her IV and her request for removal, and refusing lab draws ever happened.   These events were clearly documented by RN staff at the time of their occurrence. She reports that she is comfortable with having labs drawn and that she never had an IV started today. 10:49 AM [facility] in [facility] was contacted and report that the patient did  her prescription for Flagyl on the 06/11/19    11:47 AM I reviewed the patient's records from the most recent admission to [facility] in [place] on 05/09/19-05/12/19 that show that the patient was given a prescription of Roxicet with a start date of 8/2014 & and end date of 05/9/19 by Dr. Carlos Rosa. There is also written documentation in her medical record on her medication list stating that she has a prescription for 8.2 mg of Suboxen that was last taken 05/02/19. Patient did not disclose this information to us. 12:07 PM ED received a call from a man claiming to be the patient's significant other inquiring about what pain medication she is going to be given and whether she's been given any pain medications here. No male was listed on her permission form to give information to over the phone. When [facility] informed the man that we could not give information over the phone, and [facility] asked for a phone number so she could notify patient so that patient could provide him with any information she would like, he hung up abruptly. MDM  The patient was seen and evaluated within the ED today following abdominal pain. Within the department, I observed the patient's vital signs to be within acceptable range. On exam, I appreciated no abdominal tenderness. Within the department, the patient was treated with Haldol, Phenergan, Toradol, and Flagyl. I observed the patient's condition to remain stable during the duration of their stay. I explained my proposed course of treatment to the patient, and they were amenable to my decision.  They were discharged home with a prescription for phenergan suppository, and they will return to the ED if their symptoms become more severe in nature, or otherwise change. Patient was also advised that she would be referred to complex patient committee. I find it concerning that patient did not notify us regarding medications listed in her medical record including roxicet and suboxone. Patient reported that she only had a prescription for Roxicet at the time of her hysterectomy post op care in 2014. Of note, upon patient's first visit to ED, she was positive for oxycodone in her UDS, which would be consistent with continued use of Roxicet. However, at that time, her only listed RX on [facility] was for Norco provided by physician at [facility].  She offered no explanation for her record showing rx for suboxone. Patient had no episodes of vomiting during her ED stay. She did not clinically appear dehydrated. She ambulated to bathroom once and she reported a single episode of diarrhea, though this was self-reported and not confirmed by staff. CRITICAL CARE:   None    CONSULTS:  None    PROCEDURES:  None    FINAL IMPRESSION      1. Nausea    2. Diarrhea of presumed infectious origin          DISPOSITION/PLAN   Discharge    PATIENT REFERRED TO:  Kathi Anderson, [address] [place] [address]    Schedule an appointment as soon as possible for a visit         DISCHARGE MEDICATIONS:  Discharge Medication List as of 6/15/2019 12:20 PM          (Please note that portions ofthis note were completed with a voice recognition program.  Efforts were made to edit the dictations but occasionally words are mis-transcribed.)    Scribe:  Nick Bynum 6/15/19 8:13 AM Scribing for and in the presence of Lizett Jacob MD.    Signed by: Stefany Aguilar, 06/15/19 3:35 PM    Provider:  I personally performed the services described in the documentation, reviewed and edited the documentation which was dictated to the scribe in my presence, and it accurately records my words and actions.     Lizett Jacob MD 6/15/19 3:35 PM                  Robert Del Rio Shruti Bullard MD  06/15/19 0497

## 2019-06-15 NOTE — ED NOTES
Patient ambulated to the bathroom and back without any difficulty, upon return, patient states that she had another episode of diarrhea.       Jignesh Templeton RN  06/15/19 1534

## 2019-06-17 ENCOUNTER — TELEPHONE (OUTPATIENT)
Dept: FAMILY MEDICINE CLINIC | Age: 34
End: 2019-06-17

## 2019-06-17 NOTE — LETTER
[facility] [facility]  [address], [address]  Phone: [phone]  Fax: [phone]    June 17, 2019    Elsy Pacheco  [facility]chez[facility]    Dear Saw Hernandez,    This letter is regarding your Emergency Department (ED) visit at [address] [address] on 6/15/19. Maury Haley wanted to make sure that you understand your discharge instructions and that you were able to fill any prescriptions that may have been ordered for you. Please contact the office at the above phone number if the ED advised you to follow up with Maury Haley, or if you have any further questions or needs. Also did you know -   *Visiting the ED for a non-emergency could result in higher co-pays than you would normally be subject to paying? *You can call your doctor even after hours so they can direct you to the most appropriate care. [facility]) practices can often offer you an appointment on the same day that you call. *We have some [facility] offices that offer Walk-in appointments; check our website for availability in your community, www. [employer].      *Evisits are now available for patients for $36 through [employer] for certain conditions:  * Sinus, cold and or cough       * Diarrhea            * Headache  * Heartburn                                * Poison Georgette          * Back pain     * Urinary problems                         If you do not have Skyepackhart and are interested, please contact the office and a staff member may assist you or go to [email or web address].     Sincerely,   Brenden Bell, DO and your [facility]

## 2019-12-12 ENCOUNTER — OFFICE VISIT (OUTPATIENT)
Dept: INTERNAL MEDICINE CLINIC | Age: 34
End: 2019-12-12
Payer: COMMERCIAL

## 2019-12-12 VITALS
SYSTOLIC BLOOD PRESSURE: 112 MMHG | WEIGHT: 196 LBS | DIASTOLIC BLOOD PRESSURE: 77 MMHG | HEART RATE: 116 BPM | HEIGHT: 69 IN | BODY MASS INDEX: 29.03 KG/M2

## 2019-12-12 DIAGNOSIS — F11.20 SEVERE OPIOID USE DISORDER (HCC): Primary | ICD-10-CM

## 2019-12-12 DIAGNOSIS — F11.93 OPIOID WITHDRAWAL (HCC): ICD-10-CM

## 2019-12-12 LAB
AMPHETAMINE SCREEN, URINE: ABNORMAL
BARBITURATE SCREEN, URINE: ABNORMAL
BENZODIAZEPINE SCREEN, URINE: ABNORMAL
BUPRENORPHINE URINE: ABNORMAL
COCAINE METABOLITE SCREEN URINE: ABNORMAL
GABAPENTIN SCREEN, URINE: ABNORMAL
MDMA URINE: ABNORMAL
METHADONE SCREEN, URINE: ABNORMAL
METHAMPHETAMINE, URINE: ABNORMAL
OPIATE SCREEN URINE: ABNORMAL
OXYCODONE SCREEN URINE: ABNORMAL
PHENCYCLIDINE SCREEN URINE: ABNORMAL
PROPOXYPHENE SCREEN, URINE: ABNORMAL
THC SCREEN, URINE: ABNORMAL
TRICYCLIC ANTIDEPRESSANTS, UR: ABNORMAL

## 2019-12-12 PROCEDURE — 99204 OFFICE O/P NEW MOD 45 MIN: CPT | Performed by: INTERNAL MEDICINE

## 2019-12-12 PROCEDURE — G8484 FLU IMMUNIZE NO ADMIN: HCPCS | Performed by: INTERNAL MEDICINE

## 2019-12-12 PROCEDURE — G8427 DOCREV CUR MEDS BY ELIG CLIN: HCPCS | Performed by: INTERNAL MEDICINE

## 2019-12-12 PROCEDURE — 1036F TOBACCO NON-USER: CPT | Performed by: INTERNAL MEDICINE

## 2019-12-12 PROCEDURE — G8417 CALC BMI ABV UP PARAM F/U: HCPCS | Performed by: INTERNAL MEDICINE

## 2019-12-12 PROCEDURE — 80305 DRUG TEST PRSMV DIR OPT OBS: CPT | Performed by: INTERNAL MEDICINE

## 2019-12-12 RX ORDER — BUPRENORPHINE AND NALOXONE 8; 2 MG/1; MG/1
1 FILM, SOLUBLE BUCCAL; SUBLINGUAL ONCE
Status: COMPLETED | OUTPATIENT
Start: 2019-12-12 | End: 2019-12-12

## 2019-12-12 RX ADMIN — BUPRENORPHINE AND NALOXONE 1 FILM: 8; 2 FILM, SOLUBLE BUCCAL; SUBLINGUAL at 11:56

## 2019-12-16 ENCOUNTER — HOSPITAL ENCOUNTER (EMERGENCY)
Age: 34
Discharge: HOME | End: 2019-12-16
Attending: EMERGENCY MEDICINE
Payer: COMMERCIAL

## 2019-12-16 VITALS
HEIGHT: 69 IN | SYSTOLIC BLOOD PRESSURE: 130 MMHG | OXYGEN SATURATION: 98 % | RESPIRATION RATE: 19 BRPM | DIASTOLIC BLOOD PRESSURE: 84 MMHG | TEMPERATURE: 98.5 F | WEIGHT: 198 LBS | HEART RATE: 76 BPM | BODY MASS INDEX: 29.33 KG/M2

## 2019-12-16 DIAGNOSIS — K85.90 ACUTE ON CHRONIC PANCREATITIS (HCC): Primary | ICD-10-CM

## 2019-12-16 DIAGNOSIS — K86.1 ACUTE ON CHRONIC PANCREATITIS (HCC): Primary | ICD-10-CM

## 2019-12-16 LAB
EKG ATRIAL RATE: 84 BPM
EKG P AXIS: 25 DEGREES
EKG P-R INTERVAL: 148 MS
EKG Q-T INTERVAL: 420 MS
EKG QRS DURATION: 80 MS
EKG QTC CALCULATION (BAZETT): 496 MS
EKG R AXIS: 62 DEGREES
EKG T AXIS: 58 DEGREES
EKG VENTRICULAR RATE: 84 BPM

## 2019-12-16 PROCEDURE — 93005 ELECTROCARDIOGRAM TRACING: CPT | Performed by: EMERGENCY MEDICINE

## 2019-12-16 PROCEDURE — 99284 EMERGENCY DEPT VISIT MOD MDM: CPT

## 2019-12-16 ASSESSMENT — PAIN DESCRIPTION - FREQUENCY: FREQUENCY: CONTINUOUS

## 2019-12-16 ASSESSMENT — PAIN DESCRIPTION - ONSET: ONSET: ON-GOING

## 2019-12-16 ASSESSMENT — PAIN DESCRIPTION - ORIENTATION: ORIENTATION: LEFT;MID

## 2019-12-16 ASSESSMENT — ENCOUNTER SYMPTOMS
SHORTNESS OF BREATH: 0
VOMITING: 1
NAUSEA: 1
ABDOMINAL PAIN: 1

## 2019-12-16 ASSESSMENT — PAIN DESCRIPTION - PAIN TYPE: TYPE: ACUTE PAIN

## 2019-12-16 ASSESSMENT — PAIN DESCRIPTION - DESCRIPTORS: DESCRIPTORS: STABBING;BURNING

## 2019-12-16 ASSESSMENT — PAIN DESCRIPTION - PROGRESSION: CLINICAL_PROGRESSION: GRADUALLY WORSENING

## 2019-12-16 ASSESSMENT — PAIN DESCRIPTION - LOCATION: LOCATION: ABDOMEN

## 2019-12-17 ENCOUNTER — TELEPHONE (OUTPATIENT)
Dept: FAMILY MEDICINE CLINIC | Age: 34
End: 2019-12-17

## 2019-12-17 PROCEDURE — 93010 ELECTROCARDIOGRAM REPORT: CPT | Performed by: NUCLEAR MEDICINE

## 2019-12-19 ENCOUNTER — TELEPHONE (OUTPATIENT)
Dept: FAMILY MEDICINE CLINIC | Age: 34
End: 2019-12-19

## 2020-06-04 ENCOUNTER — HOSPITAL ENCOUNTER (EMERGENCY)
Age: 35
Discharge: HOME | End: 2020-06-04
Payer: COMMERCIAL

## 2020-06-04 VITALS
WEIGHT: 179 LBS | BODY MASS INDEX: 26.51 KG/M2 | OXYGEN SATURATION: 96 % | RESPIRATION RATE: 16 BRPM | DIASTOLIC BLOOD PRESSURE: 65 MMHG | TEMPERATURE: 98.1 F | SYSTOLIC BLOOD PRESSURE: 122 MMHG | HEIGHT: 69 IN | HEART RATE: 95 BPM

## 2020-06-04 PROCEDURE — 99213 OFFICE O/P EST LOW 20 MIN: CPT | Performed by: NURSE PRACTITIONER

## 2020-06-04 PROCEDURE — L3809 WHFO W/O JOINTS PRE OTS: HCPCS

## 2020-06-04 PROCEDURE — 99212 OFFICE O/P EST SF 10 MIN: CPT

## 2020-06-04 RX ORDER — PREDNISONE 20 MG/1
20 TABLET ORAL 2 TIMES DAILY
Qty: 14 TABLET | Refills: 0 | Status: SHIPPED | OUTPATIENT
Start: 2020-06-04 | End: 2020-06-11

## 2020-06-04 RX ORDER — PROMETHAZINE HYDROCHLORIDE 25 MG/1
25 TABLET ORAL EVERY 6 HOURS PRN
COMMUNITY

## 2020-06-04 ASSESSMENT — PAIN DESCRIPTION - ONSET: ONSET: GRADUAL

## 2020-06-04 ASSESSMENT — PAIN DESCRIPTION - PAIN TYPE: TYPE: ACUTE PAIN

## 2020-06-04 ASSESSMENT — PAIN DESCRIPTION - LOCATION: LOCATION: WRIST

## 2020-06-04 ASSESSMENT — PAIN - FUNCTIONAL ASSESSMENT: PAIN_FUNCTIONAL_ASSESSMENT: ACTIVITIES ARE NOT PREVENTED

## 2020-06-04 ASSESSMENT — PAIN DESCRIPTION - DESCRIPTORS: DESCRIPTORS: ACHING

## 2020-06-04 ASSESSMENT — PAIN DESCRIPTION - FREQUENCY: FREQUENCY: CONTINUOUS

## 2020-06-04 ASSESSMENT — PAIN DESCRIPTION - ORIENTATION: ORIENTATION: RIGHT;LEFT

## 2020-06-04 ASSESSMENT — PAIN DESCRIPTION - PROGRESSION: CLINICAL_PROGRESSION: GRADUALLY WORSENING

## 2020-06-04 ASSESSMENT — PAIN SCALES - GENERAL: PAINLEVEL_OUTOF10: 2

## 2020-06-04 NOTE — ED NOTES
All discharge instructions and medications reviewed with pt at discharge. Pt upset with work slip stating that she needs one that is back dated and for 3 days off. Explained to pt that she would need to followup with pcp for additional time off, pt became very irritated and states that she is going to lose her job because the work slip doesn't say that pt was seen one day ago. Instructed pt to go directly to main er if experiencing shortness of breath, chest pain, abdominal pain or if symptoms worsen. Pt verbalizes understanding. Ambulatory to lobby in stable condition.       Lauryn Morley RN  06/04/20 7548

## 2020-06-04 NOTE — ED TRIAGE NOTES
Ambulates to room in stable condition with c/o bilateral wrist pain for 2 days. Pt states that the pain is worse in the morning and after work. Pt states that she uses pliers at work.

## 2020-06-05 ENCOUNTER — TELEPHONE (OUTPATIENT)
Dept: FAMILY MEDICINE CLINIC | Age: 35
End: 2020-06-05

## 2020-06-05 ENCOUNTER — VIRTUAL VISIT (OUTPATIENT)
Dept: FAMILY MEDICINE CLINIC | Age: 35
End: 2020-06-05
Payer: COMMERCIAL

## 2020-06-05 PROBLEM — G89.29 CHRONIC ABDOMINAL PAIN: Status: ACTIVE | Noted: 2020-06-05

## 2020-06-05 PROBLEM — F41.8 MIXED ANXIETY AND DEPRESSIVE DISORDER: Status: ACTIVE | Noted: 2020-06-05

## 2020-06-05 PROBLEM — E87.6 HYPOKALEMIA: Status: ACTIVE | Noted: 2019-12-17

## 2020-06-05 PROBLEM — K86.2 PANCREATIC CYST: Status: ACTIVE | Noted: 2020-06-05

## 2020-06-05 PROBLEM — F11.90 OPIOID USE DISORDER: Status: ACTIVE | Noted: 2019-12-17

## 2020-06-05 PROBLEM — R10.9 CHRONIC ABDOMINAL PAIN: Status: ACTIVE | Noted: 2020-06-05

## 2020-06-05 PROBLEM — K86.1 ACUTE ON CHRONIC PANCREATITIS (HCC): Status: ACTIVE | Noted: 2019-12-14

## 2020-06-05 PROBLEM — M25.539 ACUTE WRIST PAIN: Status: ACTIVE | Noted: 2020-06-05

## 2020-06-05 PROBLEM — E43 SEVERE PROTEIN-CALORIE MALNUTRITION (HCC): Status: ACTIVE | Noted: 2019-12-18

## 2020-06-05 PROBLEM — K85.90 ACUTE ON CHRONIC PANCREATITIS (HCC): Status: ACTIVE | Noted: 2019-12-14

## 2020-06-05 PROCEDURE — G8428 CUR MEDS NOT DOCUMENT: HCPCS | Performed by: NURSE PRACTITIONER

## 2020-06-05 PROCEDURE — 99214 OFFICE O/P EST MOD 30 MIN: CPT | Performed by: NURSE PRACTITIONER

## 2020-06-05 RX ORDER — IBUPROFEN 600 MG/1
600 TABLET ORAL 4 TIMES DAILY PRN
Qty: 360 TABLET | Refills: 1 | Status: SHIPPED | OUTPATIENT
Start: 2020-06-05

## 2020-06-05 RX ORDER — CYCLOBENZAPRINE HCL 5 MG
5 TABLET ORAL NIGHTLY PRN
Qty: 30 TABLET | Refills: 0 | Status: SHIPPED | OUTPATIENT
Start: 2020-06-05 | End: 2020-06-15

## 2020-06-05 ASSESSMENT — ENCOUNTER SYMPTOMS
DIARRHEA: 0
SORE THROAT: 0
COUGH: 0
SHORTNESS OF BREATH: 0
ABDOMINAL PAIN: 0
NAUSEA: 0
TROUBLE SWALLOWING: 0
CONSTIPATION: 0

## 2020-06-05 NOTE — LETTER
[address] [address],[address] [facility] [address] [place] [address]  Phone: [phone]  Fax: [phone]    DONALD Potts CNP        June 5, 2020     Patient: Jc Coffey   YOB: 1985   Date of Visit: 6/5/2020       To Whom it May Concern:    Razia Tyler was seen in my clinic on 6/5/2020. She has been to Urgent Care as well. Please excuse her from work from 6/2/2020 to 6/4/2020. She may return to work on 6/5/2020. If you have any questions or concerns, please don't hesitate to call.     Sincerely,               DONALD Potts CNP

## 2020-06-05 NOTE — PROGRESS NOTES
2020    TELEHEALTH EVALUATION -- Audio/Visual (During VBQVO-55 public health emergency)  Patient gave consent for synchronous telecommunication visit   I was present in my home utilizing Epic, patient was in their home. Visit started at   11:34 AM EDT    HPI:    Cinthya Dallas (:  1985) has requested an audio/video evaluation for the following concern(s):    HPI    Pt complains of bilateral wrist pain. She uses plyers at her new job at [employer]. Started last month. Her wrists hurt at work and ache when she goes to sleep and wakes her up. On Tuesday after she woke up she could not even make a fist with her hands. She got splints at Urgent Care and was recommended to see a specialist for possible carpal tunnel nerve test    Pt is anxious. She is scared she will lose her job. Refuses to talk about it. Depression  PHQ Scores 6/10/2019   PHQ2 Score 2   PHQ9 Score 2     Review of Systems   Constitutional: Negative for chills, fatigue and fever. HENT: Negative for congestion, sore throat and trouble swallowing. Eyes: Negative for visual disturbance. Respiratory: Negative for cough and shortness of breath. Cardiovascular: Negative for chest pain and palpitations. Gastrointestinal: Negative for abdominal pain, constipation, diarrhea and nausea. Genitourinary: Negative for frequency and urgency. Musculoskeletal: Positive for myalgias (wrist pain bilateral). Negative for joint swelling. Skin: Negative for rash. Neurological: Negative for light-headedness and headaches. Psychiatric/Behavioral: Positive for sleep disturbance. Negative for agitation, self-injury and suicidal ideas. The patient is nervous/anxious. Prior to Visit Medications    Medication Sig Taking?  Authorizing Provider   Vitamins-Lipotropics (BALANCED B-100 COMPLEX CR) TBCR Take 1 tablet by mouth 3 times daily (with meals) Yes Ginny Curtis APRN - CNP   cyclobenzaprine (FLEXERIL) 5 MG tablet Take 1 tablet nerve 7 motor function) (limited exam to video visit)          [x] No gaze palsy        [] Abnormal-         Skin:        [x] No significant exanthematous lesions or discoloration noted on facial skin         [] Abnormal-            Psychiatric:       [] Normal Affect [x] No Hallucinations        [] Abnormal-  Anxious, trembling voice    Other pertinent observable physical exam findings-     ASSESSMENT/PLAN:  1. Acute wrist pain, unspecified laterality    - Herbert Guevara MD, Neurology, [facility]  - Vitamins-Lipotropics (BALANCED B-100 COMPLEX CR) TBCR; Take 1 tablet by mouth 3 times daily (with meals)  Dispense: 270 tablet; Refill: 3  - cyclobenzaprine (FLEXERIL) 5 MG tablet; Take 1 tablet by mouth nightly as needed for Muscle spasms  Dispense: 30 tablet; Refill: 0  - ibuprofen (ADVIL;MOTRIN) 600 MG tablet; Take 1 tablet by mouth 4 times daily as needed for Pain  Dispense: 360 tablet; Refill: 1    2. Mixed anxiety and depressive disorder  Encouraged to schedule a wellness visit that we can discuss it      Total time spent 30 minutes including chart review and non-face-to face time     No follow-ups on file. Clark Holland is a 29 y.o. female being evaluated by a Virtual Visit (video visit) encounter to address concerns as mentioned above. A caregiver was present when appropriate. Due to this being a TeleHealth encounter (During [facility]- public health emergency), evaluation of the following organ systems was limited: Vitals/Constitutional/EENT/Resp/CV/GI//MS/Neuro/Skin/Heme-Lymph-Imm. Pursuant to the emergency declaration under the [address] [place] [address], [address] [place] [address] authority and the [employer] and [employer] Act, this Virtual Visit was conducted with patient's (and/or legal guardian's) consent, to reduce the patient's risk of exposure to COVID-19 and provide necessary medical care.   The patient (and/or legal guardian) has also been advised

## 2020-06-08 ASSESSMENT — ENCOUNTER SYMPTOMS
SHORTNESS OF BREATH: 0
BACK PAIN: 0
COLOR CHANGE: 0
ALLERGIC/IMMUNOLOGIC NEGATIVE: 1

## 2020-06-08 NOTE — ED PROVIDER NOTES
[phone]       Chief Complaint   Patient presents with    Wrist Pain     bilateral wrist pain        Nurses Notes reviewed and I agree except as noted in the HPI. HISTORY OF PRESENT ILLNESS   Nadine Kohler is a 29 y.o. female who presents to the urgent care for evaluation of bilateral wrist pain. She works currently at a [employer]. started within last 30 days. She uses plyers and repetitive motion with both hands/wrists frequently. Pain is 2/10, aching and constant. Onset of symptoms 2 days. Worse in the morning and after work. States she has worked in [employer] her complete work history. She has a tingling sensation to the first second and third digits bilaterally, intermittently. No recent injury. The pain does not radiate. She denies any numbness or  weakness. She has tried NSAIDs without relief. Denies any bruising, redness, warmth, swelling. REVIEW OF SYSTEMS     Review of Systems   Constitutional: Negative for activity change, appetite change, chills, diaphoresis, fatigue and fever. Respiratory: Negative for shortness of breath. Cardiovascular: Negative for chest pain. Musculoskeletal: Positive for arthralgias. Negative for back pain, joint swelling and neck pain. Skin: Negative for color change, pallor, rash and wound. Allergic/Immunologic: Negative. Neurological: Negative for weakness and numbness. Hematological: Does not bruise/bleed easily. Psychiatric/Behavioral: The patient is not nervous/anxious. PAST MEDICAL HISTORY         Diagnosis Date    Anemia     Endometriosis     Pancreatitis        SURGICAL HISTORY     Patient  has a past surgical history that includes Upper gastrointestinal endoscopy; Cholecystectomy; Tonsillectomy; Hysterectomy, vaginal; Tonsillectomy; ERCP; and EGD (2019).     CURRENT MEDICATIONS       Discharge Medication List as of 6/4/2020  5:05 PM      CONTINUE these medications which have NOT CHANGED    Details   promethazine (PHENERGAN) 25 MG tablet Take 25 mg by mouth every 6 hours as needed for NauseaHistorical Med      sucralfate (CARAFATE) 1 GM/10ML suspension Take 10 mLs by mouth 4 times daily, Disp-1200 mL, R-3Print             ALLERGIES     Patient is is allergic to ambien [zolpidem]; morphine; and zofran [ondansetron hcl]. FAMILY HISTORY     Patient'sfamily history includes Breast Cancer in her maternal grandmother and paternal grandmother; Liver Cancer (age of onset: 39) in her father; Isa Hoot in her maternal grandfather. SOCIAL HISTORY     Patient  reports that she has never smoked. She has never used smokeless tobacco. She reports current drug use. Drug: Marijuana. She reports that she does not drink alcohol. PHYSICAL EXAM     ED TRIAGE VITALS  BP: 122/65, Temp: 98.1 °F (36.7 °C), Pulse: 95, Resp: 16, SpO2: 96 %  Physical Exam  Vitals signs and nursing note reviewed. Constitutional:       General: She is not in acute distress. Appearance: Normal appearance. She is well-developed and well-groomed. She is not ill-appearing, toxic-appearing or diaphoretic. HENT:      Head: Normocephalic and atraumatic. Right Ear: Hearing normal.      Left Ear: Hearing normal.      Nose: Nose normal.      Mouth/Throat:      Lips: Pink. Mouth: Mucous membranes are moist.   Eyes:      General: Lids are normal.         Right eye: No discharge. Left eye: No discharge. Conjunctiva/sclera: Conjunctivae normal.      Right eye: Right conjunctiva is not injected. Left eye: Left conjunctiva is not injected. Pupils: Pupils are equal.   Neck:      Musculoskeletal: Full passive range of motion without pain and normal range of motion. Cardiovascular:      Rate and Rhythm: Normal rate and regular rhythm. Pulses:           Radial pulses are 2+ on the right side and 2+ on the left side.    Pulmonary:      Effort: Pulmonary effort is patient/patient representative. Questions answered. If applicable, patient/patient representative will be contacted upon receipt of final culture and sensitivity or other testing results when available. Any additions or changes to medications or changes the plan of care will be made at that time. Medications as directed, including OTC medications for supportive care. Education provided on medications. Differential diagnosis(s) discussed with patient/patient representative. Home care/self care instructions reviewed with patient/patient representative. Patient is to follow-up with family care provider in 2-3 days if no improvement. Patient is to go to the emergency department if symptoms worsen. Patient/patient representative is aware of care plan, questions answered, verbalizes understanding and is in agreement. Teach back method used for patient/patient representative teaching(s) and printed instructions attached to after visit summary. She will be taken off work for 2 days. Allow to rest wrists.         Orders Placed This Encounter   Medications    predniSONE (DELTASONE) 20 MG tablet     Sig: Take 1 tablet by mouth 2 times daily for 7 days     Dispense:  14 tablet     Refill:  0         Problem List Items Addressed This Visit     None      Visit Diagnoses     Bilateral carpal tunnel syndrome    -  Primary          PATIENT REFERRED TO:  [facility] [address] [place] [address] Rd    Call in 1 day  For appointment additional time off if need, further diagnostic study      Tristan Nenzel, APRN - [id number], APRN - CNP  06/08/20 [address] [place] [address], APRN - CNP  06/08/20 [address] [place] [address], APRN - CNP  06/08/20 [id number]